# Patient Record
Sex: FEMALE | Race: BLACK OR AFRICAN AMERICAN | Employment: FULL TIME | URBAN - METROPOLITAN AREA
[De-identification: names, ages, dates, MRNs, and addresses within clinical notes are randomized per-mention and may not be internally consistent; named-entity substitution may affect disease eponyms.]

---

## 2020-08-21 ENCOUNTER — HOSPITAL ENCOUNTER (EMERGENCY)
Age: 55
Discharge: HOME | End: 2020-08-21
Attending: EMERGENCY MEDICINE
Payer: COMMERCIAL

## 2020-08-21 ENCOUNTER — APPOINTMENT (OUTPATIENT)
Dept: CT IMAGING | Age: 55
End: 2020-08-21
Attending: EMERGENCY MEDICINE
Payer: COMMERCIAL

## 2020-08-21 VITALS
RESPIRATION RATE: 18 BRPM | HEART RATE: 90 BPM | SYSTOLIC BLOOD PRESSURE: 180 MMHG | OXYGEN SATURATION: 100 % | HEIGHT: 66 IN | DIASTOLIC BLOOD PRESSURE: 87 MMHG | BODY MASS INDEX: 30.7 KG/M2 | TEMPERATURE: 98.6 F | WEIGHT: 191 LBS

## 2020-08-21 DIAGNOSIS — S00.03XA CONTUSION OF SCALP, INITIAL ENCOUNTER: ICD-10-CM
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
## 2020-08-21 DIAGNOSIS — I10 ESSENTIAL HYPERTENSION: ICD-10-CM

## 2020-08-21 LAB
ANION GAP SERPL CALC-SCNC: 6 MMOL/L (ref 3–18)
BASOPHILS # BLD: 0 K/UL (ref 0–0.1)
BASOPHILS NFR BLD: 0 % (ref 0–2)
BUN SERPL-MCNC: 9 MG/DL (ref 7–18)
BUN/CREAT SERPL: 10 (ref 12–20)
CALCIUM SERPL-MCNC: 8.9 MG/DL (ref 8.5–10.1)
CHLORIDE SERPL-SCNC: 106 MMOL/L (ref 100–111)
CO2 SERPL-SCNC: 26 MMOL/L (ref 21–32)
CREAT SERPL-MCNC: 0.87 MG/DL (ref 0.6–1.3)
DIFFERENTIAL METHOD BLD: ABNORMAL
EOSINOPHIL # BLD: 0.1 K/UL (ref 0–0.4)
EOSINOPHIL NFR BLD: 1 % (ref 0–5)
ERYTHROCYTE [DISTWIDTH] IN BLOOD BY AUTOMATED COUNT: 13.5 % (ref 11.6–14.5)
GLUCOSE SERPL-MCNC: 94 MG/DL (ref 74–99)
HCT VFR BLD AUTO: 39.1 % (ref 35–45)
HGB BLD-MCNC: 12.8 G/DL (ref 12–16)
LYMPHOCYTES # BLD: 2.2 K/UL (ref 0.9–3.6)
LYMPHOCYTES NFR BLD: 31 % (ref 21–52)
MCH RBC QN AUTO: 28.8 PG (ref 24–34)
MCHC RBC AUTO-ENTMCNC: 32.7 G/DL (ref 31–37)
MCV RBC AUTO: 88.1 FL (ref 74–97)
MONOCYTES # BLD: 0.6 K/UL (ref 0.05–1.2)
MONOCYTES NFR BLD: 9 % (ref 3–10)
NEUTS SEG # BLD: 4.3 K/UL (ref 1.8–8)
NEUTS SEG NFR BLD: 59 % (ref 40–73)
PLATELET # BLD AUTO: 414 K/UL (ref 135–420)
PMV BLD AUTO: 8.5 FL (ref 9.2–11.8)
POTASSIUM SERPL-SCNC: 3.5 MMOL/L (ref 3.5–5.5)
RBC # BLD AUTO: 4.44 M/UL (ref 4.2–5.3)
SODIUM SERPL-SCNC: 138 MMOL/L (ref 136–145)
WBC # BLD AUTO: 7.3 K/UL (ref 4.6–13.2)

## 2020-08-21 PROCEDURE — 70450 CT HEAD/BRAIN W/O DYE: CPT

## 2020-08-21 PROCEDURE — 85025 COMPLETE CBC W/AUTO DIFF WBC: CPT

## 2020-08-21 PROCEDURE — 80048 BASIC METABOLIC PNL TOTAL CA: CPT

## 2020-08-21 PROCEDURE — 99283 EMERGENCY DEPT VISIT LOW MDM: CPT

## 2020-08-21 RX ORDER — HYDRALAZINE HYDROCHLORIDE 25 MG/1
25 TABLET, FILM COATED ORAL 3 TIMES DAILY
COMMUNITY
End: 2020-11-09 | Stop reason: SDUPTHER

## 2020-08-21 RX ORDER — ALBUTEROL SULFATE 90 UG/1
AEROSOL, METERED RESPIRATORY (INHALATION)
COMMUNITY

## 2020-08-21 RX ORDER — CETIRIZINE HYDROCHLORIDE 10 MG/1
CAPSULE, LIQUID FILLED ORAL
COMMUNITY

## 2020-08-21 RX ORDER — VERAPAMIL HYDROCHLORIDE 180 MG/1
180 TABLET, EXTENDED RELEASE ORAL 2 TIMES DAILY
COMMUNITY

## 2020-08-21 NOTE — ED TRIAGE NOTES
Patient states that she experienced a fall on Friday and struck her head. She denies LOC, but states having dizziness s/p fall. She states that she has been having headaches for weeks. Advised that she awakened this morning with nosebleed, dizziness, and left side headache with tingling. Patient states that nosebleed has been in progress x 3 hours. Mild bleeding continues at time of triage.

## 2020-08-21 NOTE — ED PROVIDER NOTES
HPI patient says she got up in the middle of the night to go to the bathroom when she got up out of bed and walk several places she became dizzy and lightheaded and fell. She said that when she fell she hit the left side of her head against the wall. She denies any loss of consciousness or mental status changes. She says since that time she is been having a dull aching pain in the left side of her head. Patient also says she has a past history of nosebleeds but been going on for several months. She says that she had a small nosebleed after the fall and another nosebleed again this morning that resolved on its own. She also states concerned about her blood pressure she says \"I have had blood pressure problems and so was 24years old\" and she wanted her blood pressure checked today 2. She denies any chest pain or shortness of breath. She denies any abdominal pain. She denies any nausea or vomiting. No other complaints given at this time. Past Medical History:   Diagnosis Date    Hypertension     Rotator cuff tear     partial       Past Surgical History:   Procedure Laterality Date    HX HYSTERECTOMY           History reviewed. No pertinent family history.     Social History     Socioeconomic History    Marital status: SINGLE     Spouse name: Not on file    Number of children: Not on file    Years of education: Not on file    Highest education level: Not on file   Occupational History    Not on file   Social Needs    Financial resource strain: Not on file    Food insecurity     Worry: Not on file     Inability: Not on file    Transportation needs     Medical: Not on file     Non-medical: Not on file   Tobacco Use    Smoking status: Not on file   Substance and Sexual Activity    Alcohol use: Not on file    Drug use: Not on file    Sexual activity: Not on file   Lifestyle    Physical activity     Days per week: Not on file     Minutes per session: Not on file    Stress: Not on file Relationships    Social connections     Talks on phone: Not on file     Gets together: Not on file     Attends Church service: Not on file     Active member of club or organization: Not on file     Attends meetings of clubs or organizations: Not on file     Relationship status: Not on file    Intimate partner violence     Fear of current or ex partner: Not on file     Emotionally abused: Not on file     Physically abused: Not on file     Forced sexual activity: Not on file   Other Topics Concern    Not on file   Social History Narrative    Not on file         ALLERGIES: Patient has no known allergies. Review of Systems   Constitutional: Negative. HENT: Negative. Eyes: Negative. Respiratory: Negative. Cardiovascular: Negative. Gastrointestinal: Negative. Genitourinary: Negative. Musculoskeletal: Negative. Neurological: Positive for headaches. Psychiatric/Behavioral: Negative. Vitals:    08/21/20 1131   BP: 180/87   Pulse: 90   Resp: 18   Temp: 98.6 °F (37 °C)   SpO2: 100%   Weight: 86.6 kg (191 lb)   Height: 5' 6\" (1.676 m)            Physical Exam  Vitals signs and nursing note reviewed. Constitutional:       Appearance: She is well-developed. HENT:      Head: Normocephalic and atraumatic. Eyes:      Conjunctiva/sclera: Conjunctivae normal.      Pupils: Pupils are equal, round, and reactive to light. Neck:      Musculoskeletal: Normal range of motion and neck supple. Cardiovascular:      Rate and Rhythm: Normal rate and regular rhythm. Pulmonary:      Effort: Pulmonary effort is normal.      Breath sounds: Normal breath sounds. Abdominal:      Palpations: Abdomen is soft. Musculoskeletal: Normal range of motion. Skin:     General: Skin is warm and dry. Neurological:      Mental Status: She is alert and oriented to person, place, and time. MDM       Procedures            I reviewed the results of the ER evaluation with the patient.   She feels much better and says she is ready to go home. She will follow-up with her primary care physician for continued monitoring of her blood pressure. Patient understands and agrees with the disposition follow-up plan.   Elaine Marie MD 12:43 PM

## 2020-08-21 NOTE — DISCHARGE INSTRUCTIONS
Patient Education        Contusion: Care Instructions  Your Care Instructions     Contusion is the medical term for a bruise. It is the result of a direct blow or an impact, such as a fall. Contusions are common sports injuries. Most people think of a bruise as a black-and-blue spot. This happens when small blood vessels get torn and leak blood under the skin. But bones, muscles, and organs can also get bruised. This may damage deep tissues but not cause a bruise you can see. The doctor will do a physical exam to find the location of your contusion. You may also have tests to make sure you do not have a more serious injury, such as a broken bone or nerve damage. These may include X-rays or other imaging tests like a CT scan or MRI. Deep-tissue contusions may cause pain and swelling. But if there is no serious damage, they will often get better in a few weeks with home treatment. The doctor has checked you carefully, but problems can develop later. If you notice any problems or new symptoms, get medical treatment right away. Follow-up care is a key part of your treatment and safety. Be sure to make and go to all appointments, and call your doctor if you are having problems. It's also a good idea to know your test results and keep a list of the medicines you take. How can you care for yourself at home? · Put ice or a cold pack on the sore area for 10 to 20 minutes at a time to stop swelling. Put a thin cloth between the ice pack and your skin. · Be safe with medicines. Read and follow all instructions on the label. ? If the doctor gave you a prescription medicine for pain, take it as prescribed. ? If you are not taking a prescription pain medicine, ask your doctor if you can take an over-the-counter medicine. · If you can, prop up the sore area on pillows as much as possible for the next few days. Try to keep the sore area above the level of your heart. When should you call for help?    Call your doctor now or seek immediate medical care if:  · Your pain gets worse. · You have new or worse swelling. · You have tingling, weakness, or numbness in the area near the contusion. · The area near the contusion is cold or pale. Watch closely for changes in your health, and be sure to contact your doctor if:  · You do not get better as expected. Where can you learn more? Go to http://sherrie-jodie.info/  Enter [id number] in the search box to learn more about \"Contusion: Care Instructions. \"  Current as of: June 26, 2019               Content Version: 12.5  © 2246-4955 [employer], [employer]. Care instructions adapted under license by [employer] (which disclaims liability or warranty for this information). If you have questions about a medical condition or this instruction, always ask your healthcare professional. [address] any warranty or liability for your use of this information.

## 2020-10-01 ENCOUNTER — OFFICE VISIT (OUTPATIENT)
Dept: CARDIOLOGY CLINIC | Age: 55
End: 2020-10-01
Payer: COMMERCIAL

## 2020-10-01 VITALS
HEIGHT: 66 IN | BODY MASS INDEX: 30.86 KG/M2 | WEIGHT: 192 LBS | TEMPERATURE: 97.4 F | DIASTOLIC BLOOD PRESSURE: 102 MMHG | HEART RATE: 87 BPM | SYSTOLIC BLOOD PRESSURE: 161 MMHG

## 2020-10-01 DIAGNOSIS — R00.2 PALPITATIONS: ICD-10-CM

## 2020-10-01 DIAGNOSIS — R06.02 SOB (SHORTNESS OF BREATH) ON EXERTION: Primary | ICD-10-CM

## 2020-10-01 DIAGNOSIS — R42 DIZZINESS: ICD-10-CM

## 2020-10-01 DIAGNOSIS — I10 ESSENTIAL HYPERTENSION: ICD-10-CM

## 2020-10-01 DIAGNOSIS — E66.9 OBESITY (BMI 30.0-34.9): ICD-10-CM

## 2020-10-01 PROCEDURE — 99204 OFFICE O/P NEW MOD 45 MIN: CPT | Performed by: INTERNAL MEDICINE

## 2020-10-01 RX ORDER — DICLOFENAC SODIUM 75 MG/1
TABLET, DELAYED RELEASE ORAL 2 TIMES DAILY
COMMUNITY
End: 2021-10-14 | Stop reason: ALTCHOICE

## 2020-10-01 RX ORDER — ESCITALOPRAM OXALATE 10 MG/1
10 TABLET ORAL DAILY
COMMUNITY
End: 2021-10-14 | Stop reason: ALTCHOICE

## 2020-10-01 RX ORDER — GABAPENTIN 600 MG/1
900 TABLET ORAL DAILY
COMMUNITY

## 2020-10-01 RX ORDER — HYDROCHLOROTHIAZIDE 12.5 MG/1
12.5 TABLET ORAL DAILY
Qty: 30 TAB | Refills: 3 | Status: SHIPPED | OUTPATIENT
Start: 2020-10-01 | End: 2021-07-29

## 2020-10-01 NOTE — PROGRESS NOTES
HISTORY OF PRESENT ILLNESS  Taty Hong is a 48 y.o. female. 10/20 Seen as new patient for uncontrolled hypertension, has had headaches and was recently diagnosed with sleep apnea. H/o nose bleeds with HTN    New Patient   The history is provided by the patient and medical records. Associated symptoms include shortness of breath. Pertinent negatives include no chest pain and no headaches. Palpitations    The history is provided by the patient. This is a new problem. The current episode started more than 1 week ago (yrs). The problem has been gradually worsening (1/20). The problem occurs every several days (almost daily). The problem is associated with nothing (activity and rest). Associated symptoms include dizziness and shortness of breath. Pertinent negatives include no fever, no malaise/fatigue, no chest pain, no claudication, no orthopnea, no PND, no syncope, no nausea, no vomiting, no headaches and no cough. Dizziness   The history is provided by the patient. This is a new problem. The current episode started more than 1 week ago (yrs). The problem occurs daily. The problem has been gradually worsening (since 1/20 when falls became more). Associated symptoms include shortness of breath. Pertinent negatives include no chest pain and no headaches. The symptoms are aggravated by standing (3-4 minutes; frequent falls). The symptoms are relieved by rest.   Shortness of Breath   The history is provided by the patient. This is a new problem. The problem occurs intermittently. The current episode started more than 1 week ago (yrs). Pertinent negatives include no fever, no headaches, no cough, no wheezing, no PND, no orthopnea, no chest pain, no syncope, no vomiting, no rash, no leg swelling and no claudication. The problem's precipitants include exercise (steps, shower, house chores).      No Known Allergies    Past Medical History:   Diagnosis Date    Bleeding from the nose     frequent    Hypertension     Rotator cuff tear     partial       Family History   Problem Relation Age of Onset    Heart defect Mother     Stroke Neg Hx     Heart Attack Neg Hx        Social History     Tobacco Use    Smoking status: Never Smoker    Smokeless tobacco: Never Used   Substance Use Topics    Alcohol use: Not Currently    Drug use: Never        Current Outpatient Medications   Medication Sig    gabapentin (NEURONTIN) 600 mg tablet Take  by mouth daily.  diclofenac EC (VOLTAREN) 75 mg EC tablet Take  by mouth two (2) times a day.  escitalopram oxalate (LEXAPRO) 10 mg tablet Take 10 mg by mouth daily.  verapamil ER (CALAN-SR) 180 mg CR tablet Take 180 mg by mouth two (2) times a day.  Cetirizine (ZyrTEC) 10 mg cap Take  by mouth.  hydrALAZINE (APRESOLINE) 25 mg tablet Take 25 mg by mouth three (3) times daily.  albuterol (ProAir HFA) 90 mcg/actuation inhaler Take  by inhalation. No current facility-administered medications for this visit. Past Surgical History:   Procedure Laterality Date    HX CARPAL TUNNEL RELEASE      HX HYSTERECTOMY      HX LAP CHOLECYSTECTOMY      HX SHOULDER ARTHROSCOPY         Visit Vitals  BP (!) 161/102 (BP 1 Location: Right arm, BP Patient Position: Standing)   Pulse 87   Temp 97.4 °F (36.3 °C) (Temporal)   Ht 5' 6\" (1.676 m)   Wt 87.1 kg (192 lb)   BMI 30.99 kg/m²     Vitals:    10/01/20 1045 10/01/20 1051 10/01/20 1052   BP: (!) 167/98 (!) 160/95 (!) 161/102   BP 1 Location: Right arm Right arm Right arm   BP Patient Position: Sitting Supine Standing   Pulse: 79 75 87   Temp: 97.4 °F (36.3 °C)     TempSrc: Temporal     Weight: 87.1 kg (192 lb)     Height: 5' 6\" (1.676 m)           Diagnostic Studies:  I have reviewed the relevant tests done on the patient and show as follows  EKG tracings reviewed by me today. EKG Results     None        XR Results (most recent):  No results found for this or any previous visit.       Ms. Anner Scheuermann has a reminder for a \"due or due soon\" health maintenance. I have asked that she contact her primary care provider for follow-up on this health maintenance. Review of Systems   Constitutional: Negative for chills, fever, malaise/fatigue and weight loss. HENT: Negative for nosebleeds. Eyes: Negative for discharge. Respiratory: Positive for shortness of breath. Negative for cough and wheezing. Cardiovascular: Positive for palpitations. Negative for chest pain, orthopnea, claudication, leg swelling, syncope and PND. Gastrointestinal: Negative for diarrhea, nausea and vomiting. Genitourinary: Negative for dysuria and hematuria. Musculoskeletal: Negative for joint pain. Skin: Negative for rash. Neurological: Positive for dizziness. Negative for seizures, loss of consciousness and headaches. Endo/Heme/Allergies: Negative for polydipsia. Does not bruise/bleed easily. Psychiatric/Behavioral: Negative for depression and substance abuse. The patient does not have insomnia. Physical Exam   Constitutional: She is oriented to person, place, and time. She appears well-developed and well-nourished. No distress. HENT:   Head: Normocephalic and atraumatic. Mouth/Throat: Normal dentition. Eyes: Right eye exhibits no discharge. Left eye exhibits no discharge. No scleral icterus. Neck: Neck supple. No JVD present. Carotid bruit is not present. No thyromegaly present. Cardiovascular: Normal rate, regular rhythm, S1 normal, S2 normal, normal heart sounds and intact distal pulses. Exam reveals no gallop and no friction rub. No murmur heard. Pulmonary/Chest: Effort normal and breath sounds normal. She has no wheezes. She has no rales. Abdominal: Soft. She exhibits no mass. There is no abdominal tenderness. Musculoskeletal:         General: No edema. Lymphadenopathy:        Right cervical: No superficial cervical adenopathy present. Left cervical: No superficial cervical adenopathy present.    Neurological: She is alert and oriented to person, place, and time. Skin: Skin is warm and dry. No rash noted. Psychiatric: She has a normal mood and affect. Her behavior is normal.       ASSESSMENT and PLAN    Patient has multiple symptoms including dizziness, palpitations, shortness of breath and intermittent edema. The symptoms are there for years. She has history of frequent falls and has surgeries on both shoulders and thinks that she falls with dizziness. No orthostatic changes in the office today. Blood pressure is elevated. Add HCTZ and follow-up labs as well as home BP chart. Check echo and event monitor. She cannot raise her arm but I think we can wait on the stress test for now. She has seen a neurologist, Dr. Lilli Kocher and apparently all work-up has been negative per patient. Not sure if EEG was done to rule out any seizures. Diagnoses and all orders for this visit:    1. SOB (shortness of breath) on exertion  -     ECHO ADULT COMPLETE; Future    2. Palpitations  -     SD EXTERNAL MOBILE CV TELEMETRY W/I&REPORT UP TO 30 DAYS    3. Dizziness  -     SD EXTERNAL MOBILE CV TELEMETRY W/I&REPORT UP TO 30 DAYS    4. Essential hypertension  -     hydroCHLOROthiazide (HYDRODIURIL) 12.5 mg tablet; Take 1 Tab by mouth daily.  -     METABOLIC PANEL, BASIC; Future  -     MAGNESIUM; Future  -     LIPID PANEL; Future  -     HEPATIC FUNCTION PANEL; Future    5. Obesity (BMI 30.0-34. 9)        Pertinent laboratory and test data reviewed and discussed with patient. See patient instructions also for other medical advice given    There are no discontinued medications.     Follow-up and Dispositions    · Return in about 6 weeks (around 11/12/2020), or if symptoms worsen or fail to improve, for post test.

## 2020-10-01 NOTE — PATIENT INSTRUCTIONS
There are no discontinued medications. After the recommended changes have been made in blood pressure medicines, patient advised to keep BP/HR(pulse rate) chart twice daily and bring us results in next office visit. Patient may send the results via \"My Chart\" if desired. Please rest for 5-10 minutes before checking blood pressure. Sit on a comfortable chair without crossing the legs and put your arm on a table. We recommend that you use an upper arm cuff. Check the blood pressure 3 times each time you check the blood pressure and record the lowest reading. If you check the blood pressure in both arms, use the higher reading. High Blood Pressure: Care Instructions Overview It's normal for blood pressure to go up and down throughout the day. But if it stays up, you have high blood pressure. Another name for high blood pressure is hypertension. Despite what a lot of people think, high blood pressure usually doesn't cause headaches or make you feel dizzy or lightheaded. It usually has no symptoms. But it does increase your risk of stroke, heart attack, and other problems. You and your doctor will talk about your risks of these problems based on your blood pressure. Your doctor will give you a goal for your blood pressure. Your goal will be based on your health and your age. Lifestyle changes, such as eating healthy and being active, are always important to help lower blood pressure. You might also take medicine to reach your blood pressure goal. 
Follow-up care is a key part of your treatment and safety. Be sure to make and go to all appointments, and call your doctor if you are having problems. It's also a good idea to know your test results and keep a list of the medicines you take. How can you care for yourself at home? Medical treatment · If you stop taking your medicine, your blood pressure will go back up. You may take one or more types of medicine to lower your blood pressure. Be safe with medicines. Take your medicine exactly as prescribed. Call your doctor if you think you are having a problem with your medicine. · Talk to your doctor before you start taking aspirin every day. Aspirin can help certain people lower their risk of a heart attack or stroke. But taking aspirin isn't right for everyone, because it can cause serious bleeding. · See your doctor regularly. You may need to see the doctor more often at first or until your blood pressure comes down. · If you are taking blood pressure medicine, talk to your doctor before you take decongestants or anti-inflammatory medicine, such as ibuprofen. Some of these medicines can raise blood pressure. · Learn how to check your blood pressure at home. Lifestyle changes · Stay at a healthy weight. This is especially important if you put on weight around the waist. Losing even 10 pounds can help you lower your blood pressure. · If your doctor recommends it, get more exercise. Walking is a good choice. Bit by bit, increase the amount you walk every day. Try for at least 30 minutes on most days of the week. You also may want to swim, bike, or do other activities. · Avoid or limit alcohol. Talk to your doctor about whether you can drink any alcohol. · Try to limit how much sodium you eat to less than 2,300 milligrams (mg) a day. Your doctor may ask you to try to eat less than 1,500 mg a day. · Eat plenty of fruits (such as bananas and oranges), vegetables, legumes, whole grains, and low-fat dairy products. · Lower the amount of saturated fat in your diet. Saturated fat is found in animal products such as milk, cheese, and meat. Limiting these foods may help you lose weight and also lower your risk for heart disease. · Do not smoke. Smoking increases your risk for heart attack and stroke. If you need help quitting, talk to your doctor about stop-smoking programs and medicines. These can increase your chances of quitting for good. When should you call for help? Call  911 anytime you think you may need emergency care. This may mean having symptoms that suggest that your blood pressure is causing a serious heart or blood vessel problem. Your blood pressure may be over 180/120. For example, call 911 if: 
  · You have symptoms of a heart attack. These may include: 
? Chest pain or pressure, or a strange feeling in the chest. 
? Sweating. ? Shortness of breath. ? Nausea or vomiting. ? Pain, pressure, or a strange feeling in the back, neck, jaw, or upper belly or in one or both shoulders or arms. ? Lightheadedness or sudden weakness. ? A fast or irregular heartbeat.  
  · You have symptoms of a stroke. These may include: 
? Sudden numbness, tingling, weakness, or loss of movement in your face, arm, or leg, especially on only one side of your body. ? Sudden vision changes. ? Sudden trouble speaking. ? Sudden confusion or trouble understanding simple statements. ? Sudden problems with walking or balance. ? A sudden, severe headache that is different from past headaches.  
  · You have severe back or belly pain. Do not wait until your blood pressure comes down on its own. Get help right away. Call your doctor now or seek immediate care if: 
  · Your blood pressure is much higher than normal (such as 180/120 or higher), but you don't have symptoms.  
  · You think high blood pressure is causing symptoms, such as: 
? Severe headache. 
? Blurry vision. Watch closely for changes in your health, and be sure to contact your doctor if: 
  · Your blood pressure measures higher than your doctor recommends at least 2 times. That means the top number is higher or the bottom number is higher, or both.  
  · You think you may be having side effects from your blood pressure medicine. Where can you learn more? Go to http://www.gray.com/ Enter V412 in the search box to learn more about \"High Blood Pressure: Care Instructions. \" Current as of: 2019               Content Version: 12.6 © 0506-6501 [employer]. Care instructions adapted under license by [employer] (which disclaims liability or warranty for this information). If you have questions about a medical condition or this instruction, always ask your healthcare professional. [address] any warranty or liability for your use of this information. [employer] Activation Thank you for requesting access to [facility]. Please follow the instructions below to securely access and download your online medical record. [facility] allows you to send messages to your doctor, view your test results, renew your prescriptions, schedule appointments, and more. How Do I Sign Up? 1. In your internet browser, go to [email or web address]. 2. Click on the First Time User? Click Here link in the Sign In box. You will see the New Member Sign Up page. 3. Enter your [facility] Access Code exactly as it appears below. You will not need to use this code after youve completed the sign-up process. If you do not sign up before the expiration date, you must request a new code. [facility] Access Code: [email or web address] Expires: 11/15/2020 10:10 AM (This is the date your [facility] access code will ) 4. Enter the last four digits of your Social Security Number (xxxx) and Date of Birth (mm/dd/yyyy) as indicated and click Submit. You will be taken to the next sign-up page. 5. Create a [facility] ID. This will be your [facility] login ID and cannot be changed, so think of one that is secure and easy to remember. 6. Create a [facility] password. You can change your password at any time. 7. Enter your Password Reset Question and Answer. This can be used at a later time if you forget your password. 8. Enter your e-mail address. You will receive e-mail notification when new information is available in [address] 9. Click Sign Up. You can now view and download portions of your medical record. 10. Click the Download Summary menu link to download a portable copy of your medical information. Additional Information If you have questions, please visit the Frequently Asked Questions section of the [employer] website at [email or web address] Remember, [facility] is NOT to be used for urgent needs. For medical emergencies, dial 911.

## 2020-10-04 DIAGNOSIS — R06.02 SOB (SHORTNESS OF BREATH) ON EXERTION: ICD-10-CM

## 2020-10-07 LAB
A-G RATIO,AGRAT: 1.1 RATIO (ref 1.1–2.6)
ALBUMIN SERPL-MCNC: 4.1 G/DL (ref 3.5–5)
ALP SERPL-CCNC: 65 U/L (ref 25–115)
ALT SERPL-CCNC: 17 U/L (ref 5–40)
ANION GAP SERPL CALC-SCNC: 9.4 MMOL/L (ref 3–15)
AST SERPL W P-5'-P-CCNC: 16 U/L (ref 10–37)
BILIRUB SERPL-MCNC: 0.3 MG/DL (ref 0.2–1.2)
BILIRUBIN, DIRECT,CBIL: <0.2 MG/DL (ref 0–0.3)
BUN SERPL-MCNC: 9 MG/DL (ref 6–22)
CALCIUM SERPL-MCNC: 9.3 MG/DL (ref 8.4–10.5)
CHLORIDE SERPL-SCNC: 102 MMOL/L (ref 98–110)
CHOLEST SERPL-MCNC: 189 MG/DL (ref 110–200)
CO2 SERPL-SCNC: 27 MMOL/L (ref 20–32)
CREAT SERPL-MCNC: 0.8 MG/DL (ref 0.5–1.2)
GFRAA, 66117: >60
GFRNA, 66118: >60
GLOBULIN,GLOB: 3.8 G/DL (ref 2–4)
GLUCOSE SERPL-MCNC: 78 MG/DL (ref 70–99)
HDLC SERPL-MCNC: 4.3 MG/DL (ref 0–5)
HDLC SERPL-MCNC: 44 MG/DL
LDL/HDL RATIO,LDHD: 2.8
LDLC SERPL CALC-MCNC: 123 MG/DL (ref 50–99)
MAGNESIUM SERPL-MCNC: 2 MG/DL (ref 1.6–2.5)
NON-HDL CHOLESTEROL, 011976: 145 MG/DL
POTASSIUM SERPL-SCNC: 3.2 MMOL/L (ref 3.5–5.5)
PROT SERPL-MCNC: 7.9 G/DL (ref 6.4–8.3)
SODIUM SERPL-SCNC: 138 MMOL/L (ref 133–145)
TRIGL SERPL-MCNC: 114 MG/DL (ref 40–149)
VLDLC SERPL CALC-MCNC: 23 MG/DL (ref 8–30)

## 2020-10-20 LAB
AV VELOCITY RATIO: 0.82
ECHO AO ASC DIAM: 3.8 CM
ECHO AO ROOT DIAM: 3.37 CM
ECHO AV PEAK GRADIENT: 4.7 MMHG
ECHO AV PEAK VELOCITY: 107.97 CM/S
ECHO EST RA PRESSURE: 3 MMHG
ECHO LA AREA 2C: 16.1 CM2
ECHO LA AREA 4C: 15.1 CM2
ECHO LA MAJOR AXIS: 3.01 CM
ECHO LA MINOR AXIS: 1.53 CM
ECHO LA TO AORTIC ROOT RATIO: 0.89
ECHO LA VOL 2C: 37.28 ML (ref 22–52)
ECHO LA VOL 4C: 34.02 ML (ref 22–52)
ECHO LA VOL BP: 41.44 ML (ref 22–52)
ECHO LA VOL/BSA BIPLANE: 21.08 ML/M2 (ref 16–28)
ECHO LA VOLUME INDEX A2C: 18.96 ML/M2 (ref 16–28)
ECHO LA VOLUME INDEX A4C: 17.31 ML/M2 (ref 16–28)
ECHO LV E' LATERAL VELOCITY: 8.23 CM/S
ECHO LV E' SEPTAL VELOCITY: 6.28 CM/S
ECHO LV EDV TEICHHOLZ: 43.75 ML
ECHO LV ESV TEICHHOLZ: 15.45 ML
ECHO LV INTERNAL DIMENSION DIASTOLIC: 4.41 CM (ref 3.9–5.3)
ECHO LV INTERNAL DIMENSION SYSTOLIC: 2.86 CM
ECHO LV IVSD: 1.37 CM (ref 0.6–0.9)
ECHO LV MASS 2D: 234.6 G (ref 67–162)
ECHO LV MASS INDEX 2D: 119.4 G/M2 (ref 43–95)
ECHO LV POSTERIOR WALL DIASTOLIC: 1.38 CM (ref 0.6–0.9)
ECHO LVOT PEAK GRADIENT: 3.1 MMHG
ECHO LVOT PEAK VELOCITY: 88.71 CM/S
ECHO MV "A" WAVE DURATION: 144.2 S
ECHO MV A VELOCITY: 56.33 CM/S
ECHO MV AREA PHT: 3.4 CM2
ECHO MV E DECELERATION TIME (DT): 219.9 MS
ECHO MV E VELOCITY: 58.51 CM/S
ECHO MV E/A RATIO: 1.04
ECHO MV E/E' LATERAL: 7.11
ECHO MV E/E' RATIO (AVERAGED): 8.21
ECHO MV E/E' SEPTAL: 9.32
ECHO MV PRESSURE HALF TIME (PHT): 63.8 MS
ECHO PULMONARY ARTERY SYSTOLIC PRESSURE (PASP): 21 MMHG
ECHO PV MAX VELOCITY: 75.72 CM/S
ECHO PV PEAK GRADIENT: 2.3 MMHG
ECHO PV PEAK INSTANTANEOUS GRADIENT SYSTOLIC: 2.3 MMHG
ECHO RA AREA 4C: 12 CM2
ECHO RV INTERNAL DIMENSION: 3.47 CM
ECHO RV TAPSE: 2.14 CM (ref 1.5–2)
ECHO TV REGURGITANT MAX VELOCITY: 213.1 CM/S
ECHO TV REGURGITANT MAX VELOCITY: 213.1 CM/S
ECHO TV REGURGITANT PEAK GRADIENT: 18.16 MMHG
ECHO TV REGURGITANT PEAK GRADIENT: 18.2 MMHG
LVFS 2D: 35.15 %
LVSV (TEICH): 28.3 ML
MV DEC SLOPE: 2.66

## 2020-11-09 ENCOUNTER — OFFICE VISIT (OUTPATIENT)
Dept: CARDIOLOGY CLINIC | Age: 55
End: 2020-11-09
Payer: COMMERCIAL

## 2020-11-09 VITALS
SYSTOLIC BLOOD PRESSURE: 166 MMHG | TEMPERATURE: 97.2 F | DIASTOLIC BLOOD PRESSURE: 98 MMHG | HEART RATE: 81 BPM | WEIGHT: 195 LBS | BODY MASS INDEX: 31.34 KG/M2 | HEIGHT: 66 IN

## 2020-11-09 DIAGNOSIS — E78.00 HYPERCHOLESTEREMIA: ICD-10-CM

## 2020-11-09 DIAGNOSIS — I10 ESSENTIAL HYPERTENSION: ICD-10-CM

## 2020-11-09 DIAGNOSIS — E66.9 OBESITY (BMI 30.0-34.9): ICD-10-CM

## 2020-11-09 DIAGNOSIS — I50.32 CHRONIC DIASTOLIC CONGESTIVE HEART FAILURE (HCC): Primary | ICD-10-CM

## 2020-11-09 PROCEDURE — 99214 OFFICE O/P EST MOD 30 MIN: CPT | Performed by: INTERNAL MEDICINE

## 2020-11-09 RX ORDER — ATORVASTATIN CALCIUM 10 MG/1
10 TABLET, FILM COATED ORAL DAILY
Qty: 30 TAB | Refills: 5 | Status: SHIPPED | OUTPATIENT
Start: 2020-11-09

## 2020-11-09 RX ORDER — HYDRALAZINE HYDROCHLORIDE 50 MG/1
50 TABLET, FILM COATED ORAL 3 TIMES DAILY
Qty: 270 TAB | Refills: 1 | Status: SHIPPED | OUTPATIENT
Start: 2020-11-09 | End: 2021-10-14 | Stop reason: SDUPTHER

## 2020-11-09 RX ORDER — OXYCODONE AND ACETAMINOPHEN 5; 325 MG/1; MG/1
TABLET ORAL
COMMUNITY
End: 2021-10-14 | Stop reason: ALTCHOICE

## 2020-11-09 RX ORDER — METOPROLOL TARTRATE 25 MG/1
25 TABLET, FILM COATED ORAL 2 TIMES DAILY
Qty: 60 TAB | Refills: 3 | Status: SHIPPED | OUTPATIENT
Start: 2020-11-09 | End: 2020-11-17

## 2020-11-09 NOTE — PROGRESS NOTES
HISTORY OF PRESENT ILLNESS  Donna Gallardo is a 48 y.o. female. 10/20 Seen as new patient for uncontrolled hypertension, has had headaches and was recently diagnosed with sleep apnea. H/o nose bleeds with HTN    Palpitations    The history is provided by the patient. This is a new problem. The current episode started more than 1 week ago (yrs). The problem has not changed (1/20) since onset. The problem occurs every several days (almost daily). The problem is associated with nothing (activity and rest). Associated symptoms include dizziness and shortness of breath. Pertinent negatives include no fever, no malaise/fatigue, no chest pain, no claudication, no orthopnea, no PND, no syncope, no nausea, no vomiting, no headaches and no cough. Her past medical history is significant for hypertension. Dizziness   The history is provided by the patient. This is a new problem. The current episode started more than 1 week ago (yrs). The problem occurs daily. The problem has not changed (since 1/20 when falls became more) since onset. Associated symptoms include shortness of breath. Pertinent negatives include no chest pain and no headaches. The symptoms are aggravated by standing (3-4 minutes; frequent falls). The symptoms are relieved by rest.   Shortness of Breath   The history is provided by the patient. This is a new problem. The problem occurs intermittently. The current episode started more than 1 week ago (yrs). The problem has not changed since onset. Pertinent negatives include no fever, no headaches, no cough, no wheezing, no PND, no orthopnea, no chest pain, no syncope, no vomiting, no rash, no leg swelling and no claudication. The problem's precipitants include exercise (steps, shower, house chores). Hypertension   The history is provided by the medical records. This is a chronic problem. Associated symptoms include shortness of breath. Pertinent negatives include no chest pain and no headaches.      No Known Allergies    Past Medical History:   Diagnosis Date    Bleeding from the nose     frequent    Hypertension     Rotator cuff tear     partial       Family History   Problem Relation Age of Onset    Heart defect Mother     Stroke Neg Hx     Heart Attack Neg Hx        Social History     Tobacco Use    Smoking status: Never Smoker    Smokeless tobacco: Never Used   Substance Use Topics    Alcohol use: Not Currently    Drug use: Never        Current Outpatient Medications   Medication Sig    oxyCODONE-acetaminophen (Percocet) 5-325 mg per tablet Take  by mouth every six (6) hours as needed for Pain.  gabapentin (NEURONTIN) 600 mg tablet Take  by mouth daily.  diclofenac EC (VOLTAREN) 75 mg EC tablet Take  by mouth two (2) times a day.  escitalopram oxalate (LEXAPRO) 10 mg tablet Take 10 mg by mouth daily.  hydroCHLOROthiazide (HYDRODIURIL) 12.5 mg tablet Take 1 Tab by mouth daily.  verapamil ER (CALAN-SR) 180 mg CR tablet Take 180 mg by mouth two (2) times a day.  Cetirizine (ZyrTEC) 10 mg cap Take  by mouth.  hydrALAZINE (APRESOLINE) 25 mg tablet Take 25 mg by mouth three (3) times daily.  albuterol (ProAir HFA) 90 mcg/actuation inhaler Take  by inhalation. No current facility-administered medications for this visit. Past Surgical History:   Procedure Laterality Date    HX CARPAL TUNNEL RELEASE      HX HYSTERECTOMY      HX LAP CHOLECYSTECTOMY      HX SHOULDER ARTHROSCOPY         Visit Vitals  BP (!) 166/98   Pulse 81   Temp 97.2 °F (36.2 °C)   Ht 5' 6\" (1.676 m)   Wt 88.5 kg (195 lb)   BMI 31.47 kg/m²     Vitals:    11/09/20 1309   BP: (!) 166/98   Pulse: 81   Temp: 97.2 °F (36.2 °C)   Weight: 88.5 kg (195 lb)   Height: 5' 6\" (1.676 m)         Diagnostic Studies:  I have reviewed the relevant tests done on the patient and show as follows  EKG tracings reviewed by me today.     EKG Results     None        XR Results (most recent):  No results found for this or any previous visit. 10/20 echInterpretation Summary     · LV: Estimated LVEF is 60 - 65%. Normal cavity size, systolic function (ejection fraction normal) and diastolic function. Moderate concentric hypertrophy. · MV: Mild mitral valve regurgitation is present. · PV: Mild pulmonic valve regurgitation is present. · AO: Mild ascending aorta dilatation. Ascending aorta diameter = 3.8 cm. · PA: Pulmonary arterial systolic pressure is 21 mmHg. Comparison Study Information     Prior Study     There is no prior study available for comparison     o    Ms. Eloina Mcmillan has a reminder for a \"due or due soon\" health maintenance. I have asked that she contact her primary care provider for follow-up on this health maintenance. Review of Systems   Constitutional: Negative for chills, fever, malaise/fatigue and weight loss. HENT: Negative for nosebleeds. Eyes: Negative for discharge. Respiratory: Positive for shortness of breath. Negative for cough and wheezing. Cardiovascular: Positive for palpitations. Negative for chest pain, orthopnea, claudication, leg swelling, syncope and PND. Gastrointestinal: Negative for diarrhea, nausea and vomiting. Genitourinary: Negative for dysuria and hematuria. Musculoskeletal: Negative for joint pain. Skin: Negative for rash. Neurological: Positive for dizziness. Negative for seizures, loss of consciousness and headaches. Endo/Heme/Allergies: Negative for polydipsia. Does not bruise/bleed easily. Psychiatric/Behavioral: Negative for depression and substance abuse. The patient does not have insomnia. Physical Exam   Constitutional: She is oriented to person, place, and time. She appears well-developed and well-nourished. No distress. obese   HENT:   Head: Normocephalic and atraumatic. Mouth/Throat: Normal dentition. Eyes: Right eye exhibits no discharge. Left eye exhibits no discharge. No scleral icterus. Neck: Neck supple. No JVD present.  Carotid bruit is not present. No thyromegaly present. Cardiovascular: Normal rate, regular rhythm, S1 normal, S2 normal, normal heart sounds and intact distal pulses. Exam reveals no gallop and no friction rub. No murmur heard. Pulmonary/Chest: Effort normal and breath sounds normal. She has no wheezes. She has no rales. Abdominal: Soft. She exhibits no mass. There is no abdominal tenderness. Musculoskeletal:         General: No edema. Lymphadenopathy:        Right cervical: No superficial cervical adenopathy present. Left cervical: No superficial cervical adenopathy present. Neurological: She is alert and oriented to person, place, and time. Skin: Skin is warm and dry. No rash noted. Psychiatric: She has a normal mood and affect. Her behavior is normal.       ASSESSMENT and PLAN    Mitral regurgitation: Mild 10/20; Aortic ectasia: 10/20 ASC ao 3.8 cm;    HLD : Results for Yeyo Quinones (MRN [id number]) as of 11/9/2020 13:26   Ref. Range 10/7/2020 11:19   Triglyceride Latest Ref Range: 40 - 149 mg/dL 114   Cholesterol, total Latest Ref Range: 110 - 200 mg/dL 189   HDL Cholesterol Latest Ref Range: >=40 mg/dL 44   CHOLESTEROL/HDL Latest Ref Range: 0.0 - 5.0  4.3   Non-HDL Cholesterol Latest Ref Range: <130 mg/dL 145 (H)   VLDL, calculated Latest Ref Range: 8 - 30 mg/dL 23   LDL, calculated Latest Ref Range: 50 - 99 mg/dL 123 (H)   LDL/HDL Ratio Unknown 2.8       As of 10/20, ACC CVD risk score is 13.5% for the next 10 years; start statins      10/20 Patient has multiple symptoms including dizziness, palpitations, shortness of breath and intermittent edema. The symptoms are there for years. She has history of frequent falls and has surgeries on both shoulders and thinks that she falls with dizziness. No orthostatic changes in the office today. Blood pressure is elevated. Add HCTZ and follow-up labs as well as home BP chart. Check echo and event monitor.   She cannot raise her arm but I think we can wait on the stress test for now. She has seen a neurologist, Dr. Ney Hope and apparently all work-up has been negative per patient. Not sure if EEG was done to rule out any seizures. 11/20 blood pressure still elevated. Add metoprolol and increase hydralazine. Continues to have significant symptoms likely secondary to chronic diastolic CHF. Diet weight and exercise discussed. She will try plant-based diet  Lipids are elevated, start statins and follow-up labs. Diagnoses and all orders for this visit:    1. Chronic diastolic congestive heart failure ([address]    2. Essential hypertension  -     hydrALAZINE (APRESOLINE) 50 mg tablet; Take 1 Tab by mouth three (3) times daily. -     metoprolol tartrate (LOPRESSOR) 25 mg tablet; Take 1 Tab by mouth two (2) times a day. 3. Obesity (BMI 30.0-34.9)    4. Hypercholesteremia  -     atorvastatin (LIPITOR) 10 mg tablet; Take 1 Tab by mouth daily.  -     LIPID PANEL; Future  -     HEPATIC FUNCTION PANEL; Future        Pertinent laboratory and test data reviewed and discussed with patient.   See patient instructions also for other medical advice given    Medications Discontinued During This Encounter   Medication Reason    hydrALAZINE (APRESOLINE) 25 mg tablet REORDER       Follow-up and Dispositions    · Return in about 3 months (around 2/9/2021), or if symptoms worsen or fail to improve, for post test.

## 2020-11-09 NOTE — PROGRESS NOTES
1. Have you been to the ER, urgent care clinic since your last visit? Hospitalized since your last visit?     no    2. Have you seen or consulted any other health care providers outside of the [address] [place] [address] since your last visit? Include any pap smears or colon screening. no    3. Since your last visit, have you had any of the following symptoms? Sob, palpitations, chest pain last night          4. Have you had any blood work, X-rays or cardiac testing? Yes , [facility]            5.  Where do you normally have your labs drawn? [facility]    6. Do you need any refills today?    no

## 2020-11-09 NOTE — PATIENT INSTRUCTIONS
Medications Discontinued During This Encounter   Medication Reason    hydrALAZINE (APRESOLINE) 25 mg tablet REORDER     After the recommended changes have been made in blood pressure medicines, patient advised to keep BP/HR(pulse rate) chart twice daily and bring us results in next 4 to 5 days. Patient may send the results via \"My Chart\" if desired. Please rest for 5-10 minutes before checking blood pressure. Sit on a comfortable chair without crossing the legs and put your arm on a table. We recommend that you use an upper arm cuff. Check the blood pressure 3 times each time you check the blood pressure and record the lowest reading. If you check the blood pressure in both arms, use the higher reading. High Blood Pressure: Care Instructions  Overview     It's normal for blood pressure to go up and down throughout the day. But if it stays up, you have high blood pressure. Another name for high blood pressure is hypertension. Despite what a lot of people think, high blood pressure usually doesn't cause headaches or make you feel dizzy or lightheaded. It usually has no symptoms. But it does increase your risk of stroke, heart attack, and other problems. You and your doctor will talk about your risks of these problems based on your blood pressure. Your doctor will give you a goal for your blood pressure. Your goal will be based on your health and your age. Lifestyle changes, such as eating healthy and being active, are always important to help lower blood pressure. You might also take medicine to reach your blood pressure goal.  Follow-up care is a key part of your treatment and safety. Be sure to make and go to all appointments, and call your doctor if you are having problems. It's also a good idea to know your test results and keep a list of the medicines you take. How can you care for yourself at home? Medical treatment  · If you stop taking your medicine, your blood pressure will go back up.  You may take one or more types of medicine to lower your blood pressure. Be safe with medicines. Take your medicine exactly as prescribed. Call your doctor if you think you are having a problem with your medicine. · Talk to your doctor before you start taking aspirin every day. Aspirin can help certain people lower their risk of a heart attack or stroke. But taking aspirin isn't right for everyone, because it can cause serious bleeding. · See your doctor regularly. You may need to see the doctor more often at first or until your blood pressure comes down. · If you are taking blood pressure medicine, talk to your doctor before you take decongestants or anti-inflammatory medicine, such as ibuprofen. Some of these medicines can raise blood pressure. · Learn how to check your blood pressure at home. Lifestyle changes  · Stay at a healthy weight. This is especially important if you put on weight around the waist. Losing even 10 pounds can help you lower your blood pressure. · If your doctor recommends it, get more exercise. Walking is a good choice. Bit by bit, increase the amount you walk every day. Try for at least 30 minutes on most days of the week. You also may want to swim, bike, or do other activities. · Avoid or limit alcohol. Talk to your doctor about whether you can drink any alcohol. · Try to limit how much sodium you eat to less than 2,300 milligrams (mg) a day. Your doctor may ask you to try to eat less than 1,500 mg a day. · Eat plenty of fruits (such as bananas and oranges), vegetables, legumes, whole grains, and low-fat dairy products. · Lower the amount of saturated fat in your diet. Saturated fat is found in animal products such as milk, cheese, and meat. Limiting these foods may help you lose weight and also lower your risk for heart disease. · Do not smoke. Smoking increases your risk for heart attack and stroke.  If you need help quitting, talk to your doctor about stop-smoking programs and medicines. These can increase your chances of quitting for good. When should you call for help? Call  911 anytime you think you may need emergency care. This may mean having symptoms that suggest that your blood pressure is causing a serious heart or blood vessel problem. Your blood pressure may be over 180/120. For example, call 911 if:    · You have symptoms of a heart attack. These may include:  ? Chest pain or pressure, or a strange feeling in the chest.  ? Sweating. ? Shortness of breath. ? Nausea or vomiting. ? Pain, pressure, or a strange feeling in the back, neck, jaw, or upper belly or in one or both shoulders or arms. ? Lightheadedness or sudden weakness. ? A fast or irregular heartbeat.     · You have symptoms of a stroke. These may include:  ? Sudden numbness, tingling, weakness, or loss of movement in your face, arm, or leg, especially on only one side of your body. ? Sudden vision changes. ? Sudden trouble speaking. ? Sudden confusion or trouble understanding simple statements. ? Sudden problems with walking or balance. ? A sudden, severe headache that is different from past headaches.     · You have severe back or belly pain. Do not wait until your blood pressure comes down on its own. Get help right away. Call your doctor now or seek immediate care if:    · Your blood pressure is much higher than normal (such as 180/120 or higher), but you don't have symptoms.     · You think high blood pressure is causing symptoms, such as:  ? Severe headache.  ? Blurry vision. Watch closely for changes in your health, and be sure to contact your doctor if:    · Your blood pressure measures higher than your doctor recommends at least 2 times. That means the top number is higher or the bottom number is higher, or both.     · You think you may be having side effects from your blood pressure medicine. Where can you learn more?   Go to http://www.gray.com/  Enter [id number] in the search box to learn more about \"High Blood Pressure: Care Instructions. \"  Current as of: December 16, 2019               Content Version: 12.6  © 9609-6287 [employer]. Care instructions adapted under license by [employer] (which disclaims liability or warranty for this information). If you have questions about a medical condition or this instruction, always ask your healthcare professional. [address] any warranty or liability for your use of this information.

## 2020-11-17 ENCOUNTER — TELEPHONE (OUTPATIENT)
Dept: CARDIOLOGY CLINIC | Age: 55
End: 2020-11-17

## 2020-11-17 DIAGNOSIS — I10 ESSENTIAL HYPERTENSION: ICD-10-CM

## 2020-11-17 RX ORDER — METOPROLOL TARTRATE 50 MG/1
50 TABLET ORAL 2 TIMES DAILY
Qty: 60 TAB | Refills: 3 | Status: SHIPPED | OUTPATIENT
Start: 2020-11-17 | End: 2021-06-08

## 2020-11-17 NOTE — TELEPHONE ENCOUNTER
BP better but still elevated. Increase metoprolol to 50 twice daily and get home BP chart for next 4 -5 days.   I have sent a prescription

## 2020-11-17 NOTE — TELEPHONE ENCOUNTER
Patient sent over BP log as instructed. These have been scanned in and ready for you to review. Thank you.

## 2020-12-08 ENCOUNTER — TRANSCRIBE ORDER (OUTPATIENT)
Dept: SCHEDULING | Age: 55
End: 2020-12-08

## 2020-12-08 DIAGNOSIS — R07.9 CHEST PAIN, UNSPECIFIED: Primary | ICD-10-CM

## 2020-12-22 ENCOUNTER — HOSPITAL ENCOUNTER (OUTPATIENT)
Dept: NON INVASIVE DIAGNOSTICS | Age: 55
Discharge: HOME | End: 2020-12-22
Payer: COMMERCIAL

## 2020-12-22 VITALS — BODY MASS INDEX: 33.29 KG/M2 | WEIGHT: 195 LBS | HEIGHT: 64 IN

## 2020-12-22 DIAGNOSIS — R07.9 CHEST PAIN, UNSPECIFIED: ICD-10-CM

## 2020-12-22 LAB
STRESS ANGINA INDEX: 2
STRESS BASELINE DIAS BP: 99 MMHG
STRESS BASELINE HR: 76 BPM
STRESS BASELINE SYS BP: 147 MMHG
STRESS ESTIMATED WORKLOAD: 7 METS
STRESS EXERCISE DUR MIN: NORMAL MIN:SEC
STRESS PEAK DIAS BP: 102 MMHG
STRESS PEAK SYS BP: 181 MMHG
STRESS PERCENT HR ACHIEVED: 76 %
STRESS POST PEAK HR: 126 BPM
STRESS RATE PRESSURE PRODUCT: NORMAL BPM*MMHG
STRESS SR DUKE TREADMILL SCORE: -3
STRESS ST DEPRESSION: 0 MM
STRESS ST ELEVATION: 0 MM
STRESS TARGET HR: 166 BPM

## 2020-12-22 PROCEDURE — 93017 CV STRESS TEST TRACING ONLY: CPT

## 2020-12-28 ENCOUNTER — TRANSCRIBE ORDER (OUTPATIENT)
Dept: SCHEDULING | Age: 55
End: 2020-12-28

## 2020-12-28 DIAGNOSIS — R07.9 CHEST PAIN: Primary | ICD-10-CM

## 2021-07-29 DIAGNOSIS — I10 ESSENTIAL HYPERTENSION: ICD-10-CM

## 2021-07-29 RX ORDER — HYDROCHLOROTHIAZIDE 12.5 MG/1
TABLET ORAL
Qty: 90 TABLET | Refills: 0 | Status: SHIPPED | OUTPATIENT
Start: 2021-07-29

## 2021-08-24 ENCOUNTER — OFFICE VISIT (OUTPATIENT)
Dept: CARDIOLOGY CLINIC | Age: 56
End: 2021-08-24
Payer: COMMERCIAL

## 2021-08-24 VITALS
DIASTOLIC BLOOD PRESSURE: 93 MMHG | HEART RATE: 71 BPM | BODY MASS INDEX: 33.8 KG/M2 | SYSTOLIC BLOOD PRESSURE: 162 MMHG | WEIGHT: 198 LBS | HEIGHT: 64 IN

## 2021-08-24 DIAGNOSIS — R00.2 PALPITATIONS: Primary | ICD-10-CM

## 2021-08-24 DIAGNOSIS — I50.32 CHRONIC DIASTOLIC CONGESTIVE HEART FAILURE (HCC): ICD-10-CM

## 2021-08-24 DIAGNOSIS — I10 ESSENTIAL HYPERTENSION: ICD-10-CM

## 2021-08-24 DIAGNOSIS — E78.00 HYPERCHOLESTEREMIA: ICD-10-CM

## 2021-08-24 PROCEDURE — 99214 OFFICE O/P EST MOD 30 MIN: CPT | Performed by: INTERNAL MEDICINE

## 2021-08-24 RX ORDER — METOPROLOL TARTRATE 50 MG/1
50 TABLET ORAL 3 TIMES DAILY
Qty: 270 TABLET | Refills: 3 | Status: SHIPPED | OUTPATIENT
Start: 2021-08-24 | End: 2021-10-14

## 2021-08-24 NOTE — PROGRESS NOTES
HISTORY OF PRESENT ILLNESS  Michael Bowman is a 55 y.o. female. 10/20 Seen as new patient for uncontrolled hypertension, has had headaches and was recently diagnosed with sleep apnea. H/o nose bleeds with HTN  8/2021  Patient is here for evaluation she presents here for evaluation of palpitation. Recently patient is under significant stress she lost her job and just able to get her another insurance. She has noted increased palpitation more at nighttime where she feels her heart is jumping out of the chest.    Palpitations   The history is provided by the patient. This is a recurrent problem. The current episode started more than 1 week ago (yrs). The problem has been gradually worsening (1/20). The problem occurs every several days (almost daily). The problem is associated with nothing (activity and rest). Associated symptoms include dizziness and shortness of breath. Pertinent negatives include no fever, no malaise/fatigue, no chest pain, no claudication, no orthopnea, no PND, no syncope, no nausea, no vomiting, no headaches and no cough. Her past medical history is significant for hypertension. Hypertension  The history is provided by the medical records. This is a chronic problem. Associated symptoms include shortness of breath. Pertinent negatives include no chest pain and no headaches. Dizziness  The history is provided by the patient. This is a new problem. The current episode started more than 1 week ago (yrs). The problem occurs daily. The problem has not changed (since 1/20 when falls became more) since onset. Associated symptoms include shortness of breath. Pertinent negatives include no chest pain and no headaches. The symptoms are aggravated by standing (3-4 minutes; frequent falls). The symptoms are relieved by rest.   Shortness of Breath  The history is provided by the patient. This is a new problem. The problem occurs intermittently. The current episode started more than 1 week ago (yrs).  The problem has not changed since onset. Pertinent negatives include no fever, no headaches, no cough, no wheezing, no PND, no orthopnea, no chest pain, no syncope, no vomiting, no rash, no leg swelling and no claudication. The problem's precipitants include exercise (steps, shower, house chores). No Known Allergies    Past Medical History:   Diagnosis Date    Bleeding from the nose     frequent    Hypertension     Rotator cuff tear     partial       Family History   Problem Relation Age of Onset    Heart defect Mother     Stroke Neg Hx     Heart Attack Neg Hx        Social History     Tobacco Use    Smoking status: Never Smoker    Smokeless tobacco: Never Used   Substance Use Topics    Alcohol use: Not Currently    Drug use: Never        Current Outpatient Medications   Medication Sig    metoprolol tartrate (LOPRESSOR) 50 mg tablet Take 1 Tablet by mouth three (3) times daily.  hydroCHLOROthiazide (HYDRODIURIL) 12.5 mg tablet take 1 tablet by mouth once daily    oxyCODONE-acetaminophen (Percocet) 5-325 mg per tablet Take  by mouth every six (6) hours as needed for Pain.  hydrALAZINE (APRESOLINE) 50 mg tablet Take 1 Tab by mouth three (3) times daily.  atorvastatin (LIPITOR) 10 mg tablet Take 1 Tab by mouth daily.  gabapentin (NEURONTIN) 600 mg tablet Take 900 mg by mouth daily.  diclofenac EC (VOLTAREN) 75 mg EC tablet Take  by mouth two (2) times a day.  escitalopram oxalate (LEXAPRO) 10 mg tablet Take 10 mg by mouth daily.  verapamil ER (CALAN-SR) 180 mg CR tablet Take 180 mg by mouth two (2) times a day.  Cetirizine (ZyrTEC) 10 mg cap Take  by mouth.  albuterol (ProAir HFA) 90 mcg/actuation inhaler Take  by inhalation. No current facility-administered medications for this visit.         Past Surgical History:   Procedure Laterality Date    HX CARPAL TUNNEL RELEASE      HX HYSTERECTOMY      HX LAP CHOLECYSTECTOMY      HX SHOULDER ARTHROSCOPY         Visit Vitals  BP (!) 162/93   Pulse 71   Ht 5' 4\" (1.626 m)   Wt 89.8 kg (198 lb)   BMI 33.99 kg/m²     Vitals:    08/24/21 1109 08/24/21 1113   BP: (!) 167/91 (!) 162/93   Pulse: 70 71   Height: 5' 4\" (1.626 m)    Weight: 89.8 kg (198 lb)          Diagnostic Studies:  I have reviewed the relevant tests done on the patient and show as follows  EKG tracings reviewed by me today. EKG Results     None        XR Results (most recent):  No results found for this or any previous visit. 10/20 echInterpretation Summary     · LV: Estimated LVEF is 60 - 65%. Normal cavity size, systolic function (ejection fraction normal) and diastolic function. Moderate concentric hypertrophy. · MV: Mild mitral valve regurgitation is present. · PV: Mild pulmonic valve regurgitation is present. · AO: Mild ascending aorta dilatation. Ascending aorta diameter = 3.8 cm. · PA: Pulmonary arterial systolic pressure is 21 mmHg. Comparison Study Information     Prior Study     There is no prior study available for comparison     o    Ms. Bettye Zarco has a reminder for a \"due or due soon\" health maintenance. I have asked that she contact her primary care provider for follow-up on this health maintenance. Review of Systems   Constitutional: Negative for chills, fever, malaise/fatigue and weight loss. HENT: Negative for nosebleeds. Eyes: Negative for discharge. Respiratory: Positive for shortness of breath. Negative for cough and wheezing. Cardiovascular: Positive for palpitations. Negative for chest pain, orthopnea, claudication, leg swelling, syncope and PND. Gastrointestinal: Negative for diarrhea, nausea and vomiting. Genitourinary: Negative for dysuria and hematuria. Musculoskeletal: Negative for joint pain. Skin: Negative for rash. Neurological: Positive for dizziness. Negative for seizures, loss of consciousness and headaches. Endo/Heme/Allergies: Negative for polydipsia. Does not bruise/bleed easily.    Psychiatric/Behavioral: Negative for depression and substance abuse. The patient does not have insomnia. Physical Exam  Constitutional:       General: She is not in acute distress. Appearance: She is well-developed. Comments: obese   HENT:      Head: Normocephalic and atraumatic. Mouth/Throat:      Dentition: Normal dentition. Eyes:      General: No scleral icterus. Right eye: No discharge. Left eye: No discharge. Neck:      Thyroid: No thyromegaly. Vascular: No carotid bruit or JVD. Cardiovascular:      Rate and Rhythm: Normal rate and regular rhythm. Pulses: Intact distal pulses. Heart sounds: Normal heart sounds, S1 normal and S2 normal. No murmur heard. No friction rub. No gallop. Pulmonary:      Effort: Pulmonary effort is normal.      Breath sounds: Normal breath sounds. No wheezing or rales. Abdominal:      Palpations: Abdomen is soft. There is no mass. Tenderness: There is no abdominal tenderness. Musculoskeletal:      Cervical back: Neck supple. Lymphadenopathy:      Cervical:      Right cervical: No superficial cervical adenopathy. Left cervical: No superficial cervical adenopathy. Skin:     General: Skin is warm and dry. Findings: No rash. Neurological:      Mental Status: She is alert and oriented to person, place, and time. Psychiatric:         Behavior: Behavior normal.         ASSESSMENT and PLAN    Mitral regurgitation: Mild 10/20; Aortic ectasia: 10/20 ASC ao 3.8 cm;    HLD : Results for Eileen Robledo (MRN [id number]) as of 11/9/2020 13:26   Ref.  Range 10/7/2020 11:19   Triglyceride Latest Ref Range: 40 - 149 mg/dL 114   Cholesterol, total Latest Ref Range: 110 - 200 mg/dL 189   HDL Cholesterol Latest Ref Range: >=40 mg/dL 44   CHOLESTEROL/HDL Latest Ref Range: 0.0 - 5.0  4.3   Non-HDL Cholesterol Latest Ref Range: <130 mg/dL 145 (H)   VLDL, calculated Latest Ref Range: 8 - 30 mg/dL 23   LDL, calculated Latest Ref Range: 50 - 99 mg/dL 123 (H)   LDL/HDL Ratio Unknown 2.8       As of 10/20, ACC CVD risk score is 13.5% for the next 10 years; start statins      10/20 Patient has multiple symptoms including dizziness, palpitations, shortness of breath and intermittent edema. The symptoms are there for years. She has history of frequent falls and has surgeries on both shoulders and thinks that she falls with dizziness. No orthostatic changes in the office today. Blood pressure is elevated. Add HCTZ and follow-up labs as well as home BP chart. Check echo and event monitor. She cannot raise her arm but I think we can wait on the stress test for now. She has seen a neurologist, Dr. Finesse Oquendo and apparently all work-up has been negative per patient. Not sure if EEG was done to rule out any seizures. 11/20 blood pressure still elevated. Add metoprolol and increase hydralazine. Continues to have significant symptoms likely secondary to chronic diastolic CHF. Diet weight and exercise discussed. She will try plant-based diet  Lipids are elevated, start statins and follow-up labs. Mary Ann Murphy MD Cardiology, Test Supervisor 12/22/2020   Interpretation Summary    · Baseline ECG: Sinus rhythm, non-specific ST-T wave abnormalities Inferior leads. · Moderate risk Duke treadmill score. · Inconclusive stress test.  · Unable to meet goal and needs further testing - Pharmacological Nuclear Stress Testing recommended. Diagnoses and all orders for this visit:    1. Palpitations  Comments:  Recent increase symptoms likely related to stress. I have adjusted medication    2. Essential hypertension  Comments:  Elevated pressure adjust medication  Orders:  -     metoprolol tartrate (LOPRESSOR) 50 mg tablet; Take 1 Tablet by mouth three (3) times daily. 3. Chronic diastolic congestive heart failure (HCC)  Comments:  Stable compensated class II    4.  Hypercholesteremia  Comments:  Continue treatment lab with PCP        Pertinent laboratory and test data reviewed and discussed with patient. See patient instructions also for other medical advice given  8/2021  Recent increased palpitation. Blood pressure uncontrolled. Significant stress recently. I will increase metoprolol to 3 times a day. Monitor symptom and consider further work-up if symptoms persist.  Medications Discontinued During This Encounter   Medication Reason    metoprolol tartrate (LOPRESSOR) 50 mg tablet        Follow-up and Dispositions    · Return in about 6 weeks (around 10/5/2021) for follow up with Dr Mark Anthony Yanez.

## 2021-08-24 NOTE — PROGRESS NOTES
1. Have you been to the ER, urgent care clinic since your last visit? Hospitalized since your last visit?     no  2. Have you seen or consulted any other health care providers outside of the [address] [place] [address] since your last visit? Include any pap smears or colon screening. No     3. Since your last visit, have you had any of the following symptoms?      palpitations.

## 2021-10-14 ENCOUNTER — OFFICE VISIT (OUTPATIENT)
Dept: CARDIOLOGY CLINIC | Age: 56
End: 2021-10-14
Payer: COMMERCIAL

## 2021-10-14 VITALS
HEIGHT: 64 IN | SYSTOLIC BLOOD PRESSURE: 144 MMHG | WEIGHT: 199 LBS | BODY MASS INDEX: 33.97 KG/M2 | DIASTOLIC BLOOD PRESSURE: 83 MMHG | HEART RATE: 68 BPM | OXYGEN SATURATION: 96 %

## 2021-10-14 DIAGNOSIS — R04.0 EPISTAXIS: ICD-10-CM

## 2021-10-14 DIAGNOSIS — E66.9 OBESITY (BMI 30.0-34.9): ICD-10-CM

## 2021-10-14 DIAGNOSIS — I10 ESSENTIAL HYPERTENSION: ICD-10-CM

## 2021-10-14 DIAGNOSIS — I50.32 CHRONIC DIASTOLIC CONGESTIVE HEART FAILURE (HCC): Primary | ICD-10-CM

## 2021-10-14 DIAGNOSIS — E78.00 HYPERCHOLESTEREMIA: ICD-10-CM

## 2021-10-14 PROCEDURE — 99214 OFFICE O/P EST MOD 30 MIN: CPT | Performed by: INTERNAL MEDICINE

## 2021-10-14 RX ORDER — METOPROLOL TARTRATE 100 MG/1
100 TABLET ORAL 2 TIMES DAILY
Qty: 180 TABLET | Refills: 1 | Status: SHIPPED | OUTPATIENT
Start: 2021-10-14 | End: 2022-04-29

## 2021-10-14 RX ORDER — DULOXETIN HYDROCHLORIDE 30 MG/1
30 CAPSULE, DELAYED RELEASE ORAL DAILY
COMMUNITY

## 2021-10-14 RX ORDER — HYDRALAZINE HYDROCHLORIDE 50 MG/1
50 TABLET, FILM COATED ORAL 3 TIMES DAILY
Qty: 270 TABLET | Refills: 1 | Status: SHIPPED | OUTPATIENT
Start: 2021-10-14 | End: 2021-12-09

## 2021-10-14 RX ORDER — MELOXICAM 15 MG/1
15 TABLET ORAL DAILY
COMMUNITY
End: 2021-12-09 | Stop reason: SINTOL

## 2021-10-14 NOTE — PROGRESS NOTES
1. Have you been to the ER, urgent care clinic since your last visit? Hospitalized since your last visit? No    2. Have you seen or consulted any other health care providers outside of the [address] [place] [address] since your last visit? Include any pap smears or colon screening. Yes Where: Neurology pain/     3. Since your last visit, have you had any of the following symptoms? chest pains, palpitations, shortness of breath, dizziness and swelling in legs/arms. 4.  Have you had any blood work, X-rays or cardiac testing? Yes Where: [facility]     Requested: NO     In [facility]: NO    5. Where do you normally have your labs drawn? PCP / [facility]    6. Do you need any refills today?    NO

## 2021-10-14 NOTE — PATIENT INSTRUCTIONS
Medications Discontinued During This Encounter   Medication Reason    escitalopram oxalate (LEXAPRO) 10 mg tablet Alternate Therapy    diclofenac EC (VOLTAREN) 75 mg EC tablet Alternate Therapy    oxyCODONE-acetaminophen (Percocet) 5-325 mg per tablet Therapy Completed    hydrALAZINE (APRESOLINE) 50 mg tablet REORDER    metoprolol tartrate (LOPRESSOR) 50 mg tablet      After the recommended changes have been made in blood pressure medicines, patient advised to keep BP/HR(pulse rate) chart twice daily and bring us results in next 4 to 5 days. Patient may send the results via \"My Chart\" if desired. Please rest for 5-10 minutes before checking blood pressure. Sit on a comfortable chair without crossing the legs and put your arm on a table. We recommend that you use an upper arm cuff. Check the blood pressure 3 times each time you check the blood pressure and record the lowest reading. If you check the blood pressure in both arms, use the higher reading. Learning About the [facility] Diet  What is the Mediterranean diet? The Mediterranean diet is a style of eating rather than a diet plan. It features foods eaten in [place], [place], [place] and [place], and other countries along the [facility]. It emphasizes eating foods like fish, fruits, vegetables, beans, high-fiber breads and whole grains, nuts, and olive oil. This style of eating includes limited red meat, cheese, and sweets. Why choose the Mediterranean diet? A Mediterranean-style diet may improve heart health. It contains more fat than other heart-healthy diets. But the fats are mainly from nuts, unsaturated oils (such as fish oils and olive oil), and certain nut or seed oils (such as canola, soybean, or flaxseed oil). These fats may help protect the heart and blood vessels. How can you get started on the Mediterranean diet? Here are some things you can do to switch to a more Mediterranean way of eating.   What to eat  · Eat a variety of fruits and vegetables each day, such as grapes, blueberries, tomatoes, broccoli, peppers, figs, olives, spinach, eggplant, beans, lentils, and chickpeas. · Eat a variety of whole-grain foods each day, such as oats, brown rice, and whole wheat bread, pasta, and couscous. · Eat fish at least 2 times a week. Try tuna, salmon, mackerel, lake trout, herring, or sardines. · Eat moderate amounts of low-fat dairy products, such as milk, cheese, or yogurt. · Eat moderate amounts of poultry and eggs. · Choose healthy (unsaturated) fats, such as nuts, olive oil, and certain nut or seed oils like canola, soybean, and flaxseed. · Limit unhealthy (saturated) fats, such as butter, palm oil, and coconut oil. And limit fats found in animal products, such as meat and dairy products made with whole milk. Try to eat red meat only a few times a month in very small amounts. · Limit sweets and desserts to only a few times a week. This includes sugar-sweetened drinks like soda. The Mediterranean diet may also include red wine with your meal1 glass each day for women and up to 2 glasses a day for men. Tips for eating at home  · Use herbs, spices, garlic, lemon zest, and citrus juice instead of salt to add flavor to foods. · Add avocado slices to your sandwich instead of rosenbaum. · Have fish for lunch or dinner instead of red meat. Brush the fish with olive oil, and broil or grill it. · Sprinkle your salad with seeds or nuts instead of cheese. · Cook with olive or canola oil instead of butter or oils that are high in saturated fat. · Switch from 2% milk or whole milk to 1% or fat-free milk. · Dip raw vegetables in a vinaigrette dressing or hummus instead of dips made from mayonnaise or sour cream.  · Have a piece of fruit for dessert instead of a piece of cake. Try baked apples, or have some dried fruit. Tips for eating out  · Try broiled, grilled, baked, or poached fish instead of having it fried or breaded.   · Ask your  to have your meals prepared with olive oil instead of butter. · Order dishes made with marinara sauce or sauces made from olive oil. Avoid sauces made from cream or mayonnaise. · Choose whole-grain breads, whole wheat pasta and pizza crust, brown rice, beans, and lentils. · Cut back on butter or margarine on bread. Instead, you can dip your bread in a small amount of olive oil. · Ask for a side salad or grilled vegetables instead of french fries or chips. Where can you learn more? Go to http://www.gentile.com/  Enter O407 in the search box to learn more about \"Learning About the Mediterranean Diet. \"  Current as of: December 17, 2020               Content Version: 13.0  © 9417-9829 [employer]. Care instructions adapted under license by [employer] (which disclaims liability or warranty for this information). If you have questions about a medical condition or this instruction, always ask your healthcare professional. [place] [address] any warranty or liability for your use of this information.

## 2021-10-14 NOTE — PROGRESS NOTES
HISTORY OF PRESENT ILLNESS  Carly Hernandez is a 55 y.o. female. Follow-up of difficult to control hypertension, CHF, hyperlipidemia, dizziness, palpitations, obesity    10/20 Seen as new patient for uncontrolled hypertension, has had headaches and was recently diagnosed with sleep apnea. H/o nose bleeds with HTN  8/2021  Patient is here for evaluation she presents here for evaluation of palpitation. Recently patient is under significant stress she lost her job and just able to get her another insurance. She has noted increased palpitation more at nighttime where she feels her heart is jumping out of the chest.  10/21 continues to have headache. Had nosebleed this morning. Blood pressure fluctuating at home. Pains all over the body and diagnosed with fibromyalgia by PCP. Palpitations   The history is provided by the patient. This is a recurrent problem. The current episode started more than 1 week ago (yrs). The problem has been gradually worsening (1/20). The problem occurs every several days (3-4/wk). On average, each episode lasts 40 seconds. The problem is associated with exercise (activity and rest). Associated symptoms include dizziness and shortness of breath. Pertinent negatives include no fever, no malaise/fatigue, no chest pain, no claudication, no orthopnea, no PND, no syncope, no nausea, no vomiting, no headaches and no cough. Her past medical history is significant for hypertension. Dizziness  The history is provided by the patient. This is a new problem. The current episode started more than 1 week ago (yrs). The problem occurs daily (Intermittently). The problem has not changed (since 1/20 when falls became more) since onset. Associated symptoms include shortness of breath. Pertinent negatives include no chest pain and no headaches. The symptoms are aggravated by standing (3-4 minutes; frequent falls).  The symptoms are relieved by rest.   Shortness of Breath  The history is provided by the patient. This is a new problem. The problem occurs intermittently. The current episode started more than 1 week ago (yrs). The problem has not changed since onset. Pertinent negatives include no fever, no headaches, no cough, no wheezing, no PND, no orthopnea, no chest pain, no syncope, no vomiting, no rash, no leg swelling and no claudication. The problem's precipitants include exercise (steps, shower, house chores). Follow-up  Associated symptoms include shortness of breath. Pertinent negatives include no chest pain and no headaches. Allergies   Allergen Reactions    Latex Rash       Past Medical History:   Diagnosis Date    Bleeding from the nose     frequent    Hypertension     Rotator cuff tear     partial       Family History   Problem Relation Age of Onset    Heart defect Mother     Stroke Neg Hx     Heart Attack Neg Hx        Social History     Tobacco Use    Smoking status: Never Smoker    Smokeless tobacco: Never Used   Substance Use Topics    Alcohol use: Not Currently    Drug use: Never        Current Outpatient Medications   Medication Sig    DULoxetine (Cymbalta) 30 mg capsule Take 30 mg by mouth daily.  meloxicam (MOBIC) 15 mg tablet Take 15 mg by mouth daily.  metoprolol tartrate (LOPRESSOR) 50 mg tablet Take 1 Tablet by mouth three (3) times daily.  hydroCHLOROthiazide (HYDRODIURIL) 12.5 mg tablet take 1 tablet by mouth once daily    hydrALAZINE (APRESOLINE) 50 mg tablet Take 1 Tab by mouth three (3) times daily. (Patient taking differently: Take 50 mg by mouth two (2) times a day.)    atorvastatin (LIPITOR) 10 mg tablet Take 1 Tab by mouth daily.  gabapentin (NEURONTIN) 600 mg tablet Take 900 mg by mouth daily.  verapamil ER (CALAN-SR) 180 mg CR tablet Take 180 mg by mouth two (2) times a day.  Cetirizine (ZyrTEC) 10 mg cap Take  by mouth.  albuterol (ProAir HFA) 90 mcg/actuation inhaler Take  by inhalation.      No current facility-administered medications for this visit. Past Surgical History:   Procedure Laterality Date    HX CARPAL TUNNEL RELEASE      HX HYSTERECTOMY      HX LAP CHOLECYSTECTOMY      HX SHOULDER ARTHROSCOPY         Visit Vitals  BP (!) 144/83 (BP 1 Location: Right arm, BP Patient Position: Sitting, BP Cuff Size: Large adult)   Pulse 68   Ht 5' 4\" (1.626 m)   Wt 90.3 kg (199 lb)   SpO2 96%   BMI 34.16 kg/m²     Vitals:    10/14/21 1028 10/14/21 1046   BP: (!) 159/86 (!) 144/83   BP 1 Location: Right arm Right arm   BP Patient Position: Sitting Sitting   BP Cuff Size: Adult Large adult   Pulse: 73 68   Height: 5' 4\" (1.626 m)    Weight: 90.3 kg (199 lb)    SpO2: 96%          Diagnostic Studies:  I have reviewed the relevant tests done on the patient and show as follows  EKG tracings reviewed by me today. EKG Results     None        XR Results (most recent):  No results found for this or any previous visit. 10/20 echInterpretation Summary     · LV: Estimated LVEF is 60 - 65%. Normal cavity size, systolic function (ejection fraction normal) and diastolic function. Moderate concentric hypertrophy. · MV: Mild mitral valve regurgitation is present. · PV: Mild pulmonic valve regurgitation is present. · AO: Mild ascending aorta dilatation. Ascending aorta diameter = 3.8 cm. · PA: Pulmonary arterial systolic pressure is 21 mmHg. Comparison Study Information     Prior Study     There is no prior study available for comparison     o    Ms. Jorge Reyes has a reminder for a \"due or due soon\" health maintenance. I have asked that she contact her primary care provider for follow-up on this health maintenance. Review of Systems   Constitutional: Negative for chills, fever, malaise/fatigue and weight loss. HENT: Negative for nosebleeds. Eyes: Negative for discharge. Respiratory: Positive for shortness of breath. Negative for cough and wheezing. Cardiovascular: Positive for palpitations.  Negative for chest pain, orthopnea, claudication, leg swelling, syncope and PND. Gastrointestinal: Negative for diarrhea, nausea and vomiting. Genitourinary: Negative for dysuria and hematuria. Musculoskeletal: Negative for joint pain. Skin: Negative for rash. Neurological: Positive for dizziness. Negative for seizures, loss of consciousness and headaches. Endo/Heme/Allergies: Negative for polydipsia. Does not bruise/bleed easily. Psychiatric/Behavioral: Negative for depression and substance abuse. The patient does not have insomnia. Physical Exam  Constitutional:       General: She is not in acute distress. Appearance: She is well-developed. Comments: obese   HENT:      Head: Normocephalic and atraumatic. Mouth/Throat:      Dentition: Normal dentition. Eyes:      General: No scleral icterus. Right eye: No discharge. Left eye: No discharge. Neck:      Thyroid: No thyromegaly. Vascular: No carotid bruit or JVD. Cardiovascular:      Rate and Rhythm: Normal rate and regular rhythm. Pulses: Intact distal pulses. Heart sounds: Normal heart sounds, S1 normal and S2 normal. No murmur heard. No friction rub. No gallop. Pulmonary:      Effort: Pulmonary effort is normal.      Breath sounds: Normal breath sounds. No wheezing or rales. Abdominal:      Palpations: Abdomen is soft. There is no mass. Tenderness: There is no abdominal tenderness. Musculoskeletal:      Cervical back: Neck supple. Lymphadenopathy:      Cervical:      Right cervical: No superficial cervical adenopathy. Left cervical: No superficial cervical adenopathy. Skin:     General: Skin is warm and dry. Findings: No rash. Neurological:      Mental Status: She is alert and oriented to person, place, and time. Psychiatric:         Behavior: Behavior normal.         ASSESSMENT and PLAN    Mitral regurgitation: Mild 10/20;   Aortic ectasia: 10/20 ASC ao 3.8 cm;    HLD : Results for Augustaelisecass Montgomery (MRN [id number]) as of 11/9/2020 13:26   Ref. Range 10/7/2020 11:19   Triglyceride Latest Ref Range: 40 - 149 mg/dL 114   Cholesterol, total Latest Ref Range: 110 - 200 mg/dL 189   HDL Cholesterol Latest Ref Range: >=40 mg/dL 44   CHOLESTEROL/HDL Latest Ref Range: 0.0 - 5.0  4.3   Non-HDL Cholesterol Latest Ref Range: <130 mg/dL 145 (H)   VLDL, calculated Latest Ref Range: 8 - 30 mg/dL 23   LDL, calculated Latest Ref Range: 50 - 99 mg/dL 123 (H)   LDL/HDL Ratio Unknown 2.8   6/21 , HDL 40, , ; As of 10/20, ACC CVD risk score is 13.5% for the next 10 years; start statins      10/20 Patient has multiple symptoms including dizziness, palpitations, shortness of breath and intermittent edema. The symptoms are there for years. She has history of frequent falls and has surgeries on both shoulders and thinks that she falls with dizziness. No orthostatic changes in the office today. Blood pressure is elevated. Add HCTZ and follow-up labs as well as home BP chart. Check echo and event monitor. She cannot raise her arm but I think we can wait on the stress test for now. She has seen a neurologist, Dr. Jb Wilson and apparently all work-up has been negative per patient. Not sure if EEG was done to rule out any seizures. 11/20 blood pressure still elevated. Add metoprolol and increase hydralazine. Continues to have significant symptoms likely secondary to chronic diastolic CHF. Diet weight and exercise discussed. She will try plant-based diet  Lipids are elevated, start statins and follow-up labs. Alina Solorzano MD Cardiology, Test Supervisor 12/22/2020   Interpretation Summary    · Baseline ECG: Sinus rhythm, non-specific ST-T wave abnormalities Inferior leads. · Moderate risk Duke treadmill score. · Inconclusive stress test.  · Unable to meet goal and needs further testing - Pharmacological Nuclear Stress Testing recommended. 8/2021  Recent increased palpitation. Blood pressure uncontrolled. Significant stress recently. I will increase metoprolol to 3 times a day. Monitor symptom and consider further work-up if symptoms persist.      Diagnoses and all orders for this visit:    1. Chronic diastolic congestive heart failure (HCC)  -     NUCLEAR CARDIAC STRESS TEST; Future    2. Essential hypertension  -     metoprolol tartrate (LOPRESSOR) 100 mg IR tablet; Take 1 Tablet by mouth two (2) times a day. -     hydrALAZINE (APRESOLINE) 50 mg tablet; Take 1 Tablet by mouth three (3) times daily. 3. Hypercholesteremia    4. Obesity (BMI 30.0-34.9)    5. Epistaxis  -     REFERRAL TO ENT-OTOLARYNGOLOGY        Pertinent laboratory and test data reviewed and discussed with patient. See patient instructions also for other medical advice given    Medications Discontinued During This Encounter   Medication Reason    escitalopram oxalate (LEXAPRO) 10 mg tablet Alternate Therapy    diclofenac EC (VOLTAREN) 75 mg EC tablet Alternate Therapy    oxyCODONE-acetaminophen (Percocet) 5-325 mg per tablet Therapy Completed    hydrALAZINE (APRESOLINE) 50 mg tablet REORDER    metoprolol tartrate (LOPRESSOR) 50 mg tablet        Follow-up and Dispositions    · Return in about 2 months (around 12/14/2021), or if symptoms worsen or fail to improve, for post test, BP log x 4-5 days post med changes. 10/14/2021 CHF persistent NYHA class II-III. Blood pressure better today but intermittently still very high at home as patient informed. Increase metoprolol 200 twice daily and advised to take hydralazine 3 times a day. Recurrent epistaxis and will refer to ENT. Diet weight discussed again. Mediterranean diet guidelines printed. Lipids are not controlled very well. Stressed the diet plan as well as weight loss and regular walking.

## 2021-12-09 ENCOUNTER — OFFICE VISIT (OUTPATIENT)
Dept: CARDIOLOGY CLINIC | Age: 56
End: 2021-12-09
Payer: COMMERCIAL

## 2021-12-09 VITALS
HEART RATE: 61 BPM | BODY MASS INDEX: 33.29 KG/M2 | WEIGHT: 195 LBS | SYSTOLIC BLOOD PRESSURE: 189 MMHG | DIASTOLIC BLOOD PRESSURE: 99 MMHG | HEIGHT: 64 IN

## 2021-12-09 DIAGNOSIS — E66.9 OBESITY (BMI 30.0-34.9): ICD-10-CM

## 2021-12-09 DIAGNOSIS — I10 SEVERE UNCONTROLLED HYPERTENSION: ICD-10-CM

## 2021-12-09 DIAGNOSIS — E78.00 HYPERCHOLESTEREMIA: ICD-10-CM

## 2021-12-09 DIAGNOSIS — I10 SEVERE UNCONTROLLED HYPERTENSION: Primary | ICD-10-CM

## 2021-12-09 DIAGNOSIS — I50.32 CHRONIC DIASTOLIC CONGESTIVE HEART FAILURE (HCC): ICD-10-CM

## 2021-12-09 PROCEDURE — 99214 OFFICE O/P EST MOD 30 MIN: CPT | Performed by: INTERNAL MEDICINE

## 2021-12-09 PROCEDURE — 93000 ELECTROCARDIOGRAM COMPLETE: CPT | Performed by: INTERNAL MEDICINE

## 2021-12-09 RX ORDER — CANE TIPS
2 EACH MISCELLANEOUS ONCE
Qty: 2 EACH | Refills: 0 | Status: SHIPPED | OUTPATIENT
Start: 2021-12-09 | End: 2021-12-09

## 2021-12-09 RX ORDER — CLONIDINE HYDROCHLORIDE 0.1 MG/1
0.1 TABLET ORAL
Qty: 90 TABLET | Refills: 1 | Status: SHIPPED | OUTPATIENT
Start: 2021-12-09 | End: 2022-07-26

## 2021-12-09 RX ORDER — ATOGEPANT 60 MG/1
TABLET ORAL
COMMUNITY

## 2021-12-09 RX ORDER — HYDRALAZINE HYDROCHLORIDE 100 MG/1
100 TABLET, FILM COATED ORAL 3 TIMES DAILY
Qty: 270 TABLET | Refills: 1 | Status: SHIPPED | OUTPATIENT
Start: 2021-12-09 | End: 2022-07-26

## 2021-12-09 NOTE — PROGRESS NOTES
HISTORY OF PRESENT ILLNESS  Tiffani Coffman is a 55 y.o. female. Follow-up of difficult to control hypertension, CHF, hyperlipidemia, dizziness, palpitations, obesity    12/21 continues to have headaches and dizziness consistent with vertigo. Also being followed by ENT. Status post ablation of a vessel in the nose and since then no problems with the epistaxis. 10/20 Seen as new patient for uncontrolled hypertension, has had headaches and was recently diagnosed with sleep apnea. H/o nose bleeds with HTN  8/2021  Patient is here for evaluation she presents here for evaluation of palpitation. Recently patient is under significant stress she lost her job and just able to get her another insurance. She has noted increased palpitation more at nighttime where she feels her heart is jumping out of the chest.  10/21 continues to have headache. Had nosebleed this morning. Blood pressure fluctuating at home. Pains all over the body and diagnosed with fibromyalgia by PCP. Follow-up  Associated symptoms include shortness of breath. Pertinent negatives include no chest pain and no headaches. Palpitations   The history is provided by the patient. This is a recurrent problem. The current episode started more than 1 week ago (yrs). The problem has been gradually worsening (1/20). The problem occurs every several days (3-4/wk). On average, each episode lasts 40 seconds. The problem is associated with exercise (activity and rest). Associated symptoms include dizziness and shortness of breath. Pertinent negatives include no fever, no malaise/fatigue, no chest pain, no claudication, no orthopnea, no PND, no syncope, no nausea, no vomiting, no headaches and no cough. Her past medical history is significant for hypertension. Dizziness  The history is provided by the patient. This is a new problem. The current episode started more than 1 week ago (yrs). The problem occurs daily (Intermittently).  The problem has not changed (since 1/20 when falls became more) since onset. Associated symptoms include shortness of breath. Pertinent negatives include no chest pain and no headaches. The symptoms are aggravated by standing (3-4 minutes; frequent falls). The symptoms are relieved by rest.   Shortness of Breath  The history is provided by the patient. This is a new problem. The problem occurs intermittently. The current episode started more than 1 week ago (yrs). The problem has not changed since onset. Pertinent negatives include no fever, no headaches, no cough, no wheezing, no PND, no orthopnea, no chest pain, no syncope, no vomiting, no rash, no leg swelling and no claudication. The problem's precipitants include exercise (steps, shower, house chores). Allergies   Allergen Reactions    Latex Rash       Past Medical History:   Diagnosis Date    Bleeding from the nose     frequent    Hypertension     Rotator cuff tear     partial       Family History   Problem Relation Age of Onset    Heart defect Mother     Stroke Neg Hx     Heart Attack Neg Hx        Social History     Tobacco Use    Smoking status: Never Smoker    Smokeless tobacco: Never Used   Substance Use Topics    Alcohol use: Not Currently    Drug use: Never        Current Outpatient Medications   Medication Sig    atogepant (Qulipta) 60 mg tab Take  by mouth.  DULoxetine (Cymbalta) 30 mg capsule Take 30 mg by mouth daily.  meloxicam (MOBIC) 15 mg tablet Take 15 mg by mouth daily.  metoprolol tartrate (LOPRESSOR) 100 mg IR tablet Take 1 Tablet by mouth two (2) times a day.  hydrALAZINE (APRESOLINE) 50 mg tablet Take 1 Tablet by mouth three (3) times daily.  hydroCHLOROthiazide (HYDRODIURIL) 12.5 mg tablet take 1 tablet by mouth once daily    atorvastatin (LIPITOR) 10 mg tablet Take 1 Tab by mouth daily.  gabapentin (NEURONTIN) 600 mg tablet Take 900 mg by mouth daily.     verapamil ER (CALAN-SR) 180 mg CR tablet Take 180 mg by mouth two (2) times a day.    Cetirizine (ZyrTEC) 10 mg cap Take  by mouth.  albuterol (ProAir HFA) 90 mcg/actuation inhaler Take  by inhalation. No current facility-administered medications for this visit. Past Surgical History:   Procedure Laterality Date    HX CARPAL TUNNEL RELEASE      HX HYSTERECTOMY      HX LAP CHOLECYSTECTOMY      HX SHOULDER ARTHROSCOPY         Visit Vitals  BP (!) 189/99   Pulse 61   Ht 5' 4\" (1.626 m)   Wt 88.5 kg (195 lb)   BMI 33.47 kg/m²     Vitals:    12/09/21 1022 12/09/21 1038   BP: (!) 199/94 (!) 189/99   Pulse: 65 61   Height: 5' 4\" (1.626 m)    Weight: 88.5 kg (195 lb)          Diagnostic Studies:  I have reviewed the relevant tests done on the patient and show as follows  EKG tracings reviewed by me today. EKG Results     Procedure 720 Value Units Date/Time    AMB POC EKG ROUTINE W/ 12 LEADS, INTER & REP [[id number]] Resulted: 12/09/21 1040    Order Status: Completed Updated: 12/09/21 1048        XR Results (most recent):  No results found for this or any previous visit. 10/20 echInterpretation Summary     · LV: Estimated LVEF is 60 - 65%. Normal cavity size, systolic function (ejection fraction normal) and diastolic function. Moderate concentric hypertrophy. · MV: Mild mitral valve regurgitation is present. · PV: Mild pulmonic valve regurgitation is present. · AO: Mild ascending aorta dilatation. Ascending aorta diameter = 3.8 cm. · PA: Pulmonary arterial systolic pressure is 21 mmHg. Comparison Study Information     Prior Study     There is no prior study available for comparison     10/28/21    NUCLEAR CARDIAC STRESS TEST 10/28/2021 11/2/2021    Interpretation Summary  · Baseline ECG: Normal sinus rhythm, non-specific ST-T wave abnormalities. · Stress test: Stress EKG normal.  · SPECT: Calculated ejection fraction is 81% (normal EF value is equal to or greater than 50%).  The TID ratio is 1.17.  · SPECT: Myocardial perfusion imaging defect 1: caused by soft tissue. Scanned left arm down  · SPECT: Left ventricular perfusion is probably normal. Myocardial perfusion imaging defect 1: There is a defect that is small in size present in the mid-apical segment(s) of the anterior and septal wall(s) that is non-reversible. There is normal wall motion in the defect area. Viability in the area is good. The defect appears to be an artifact caused by soft tissue. Perfusion defect was visually present without quantitative evidence. · SPECT: Left ventricular perfusion is probably normal. Myocardial perfusion imaging supports a low risk stress test.    Signed by: Jethro Rodríguez MD on 10/28/2021  2:15 PM    Ms. Cheri Carreno has a reminder for a \"due or due soon\" health maintenance. I have asked that she contact her primary care provider for follow-up on this health maintenance. Review of Systems   Constitutional: Negative for chills, fever, malaise/fatigue and weight loss. HENT: Negative for nosebleeds. Eyes: Negative for discharge. Respiratory: Positive for shortness of breath. Negative for cough and wheezing. Cardiovascular: Positive for palpitations. Negative for chest pain, orthopnea, claudication, leg swelling, syncope and PND. Gastrointestinal: Negative for diarrhea, nausea and vomiting. Genitourinary: Negative for dysuria and hematuria. Musculoskeletal: Negative for joint pain. Skin: Negative for rash. Neurological: Positive for dizziness. Negative for seizures, loss of consciousness and headaches. Endo/Heme/Allergies: Negative for polydipsia. Does not bruise/bleed easily. Psychiatric/Behavioral: Negative for depression and substance abuse. The patient does not have insomnia. Physical Exam  Constitutional:       General: She is not in acute distress. Appearance: She is well-developed. Comments: obese   HENT:      Head: Normocephalic and atraumatic. Mouth/Throat:      Dentition: Normal dentition.    Eyes:      General: No scleral icterus. Right eye: No discharge. Left eye: No discharge. Neck:      Thyroid: No thyromegaly. Vascular: No carotid bruit or JVD. Cardiovascular:      Rate and Rhythm: Normal rate and regular rhythm. Pulses: Intact distal pulses. Heart sounds: Normal heart sounds, S1 normal and S2 normal. No murmur heard. No friction rub. No gallop. Pulmonary:      Effort: Pulmonary effort is normal.      Breath sounds: Normal breath sounds. No wheezing or rales. Abdominal:      Palpations: Abdomen is soft. There is no mass. Tenderness: There is no abdominal tenderness. Musculoskeletal:      Cervical back: Neck supple. Lymphadenopathy:      Cervical:      Right cervical: No superficial cervical adenopathy. Left cervical: No superficial cervical adenopathy. Skin:     General: Skin is warm and dry. Findings: No rash. Neurological:      Mental Status: She is alert and oriented to person, place, and time. Psychiatric:         Behavior: Behavior normal.         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 Range 10/7/2020 11:19   Triglyceride Latest Ref Range: 40 - 149 mg/dL 114   Cholesterol, total Latest Ref Range: 110 - 200 mg/dL 189   HDL Cholesterol Latest Ref Range: >=40 mg/dL 44   CHOLESTEROL/HDL Latest Ref Range: 0.0 - 5.0  4.3   Non-HDL Cholesterol Latest Ref Range: <130 mg/dL 145 (H)   VLDL, calculated Latest Ref Range: 8 - 30 mg/dL 23   LDL, calculated Latest Ref Range: 50 - 99 mg/dL 123 (H)   LDL/HDL Ratio Unknown 2.8   6/21 , HDL 40, , ; As of 10/20, ACC CVD risk score is 13.5% for the next 10 years; start statins      10/20 Patient has multiple symptoms including dizziness, palpitations, shortness of breath and intermittent edema. The symptoms are there for years.   She has history of frequent falls and has surgeries on both shoulders and thinks that she falls with dizziness. No orthostatic changes in the office today. Blood pressure is elevated. Add HCTZ and follow-up labs as well as home BP chart. Check echo and event monitor. She cannot raise her arm but I think we can wait on the stress test for now. She has seen a neurologist, Dr. Pedro Clinton and apparently all work-up has been negative per patient. Not sure if EEG was done to rule out any seizures. 11/20 blood pressure still elevated. Add metoprolol and increase hydralazine. Continues to have significant symptoms likely secondary to chronic diastolic CHF. Diet weight and exercise discussed. She will try plant-based diet  Lipids are elevated, start statins and follow-up labs. Gia Chacon MD Cardiology, Test Supervisor 12/22/2020   Interpretation Summary    · Baseline ECG: Sinus rhythm, non-specific ST-T wave abnormalities Inferior leads. · Moderate risk Duke treadmill score. · Inconclusive stress test.  · Unable to meet goal and needs further testing - Pharmacological Nuclear Stress Testing recommended. 8/2021  Recent increased palpitation. Blood pressure uncontrolled. Significant stress recently. I will increase metoprolol to 3 times a day. Monitor symptom and consider further work-up if symptoms persist.    10/14/2021 CHF persistent NYHA class II-III. Blood pressure better today but intermittently still very high at home as patient informed. Increase metoprolol 200 twice daily and advised to take hydralazine 3 times a day. Recurrent epistaxis and will refer to ENT. Diet weight discussed again. Mediterranean diet guidelines printed. Lipids are not controlled very well. Stressed the diet plan as well as weight loss and regular walking. Diagnoses and all orders for this visit:    1. Severe uncontrolled hypertension  -     AMB POC EKG ROUTINE W/ 12 LEADS, INTER & REP  -     hydrALAZINE (APRESOLINE) 100 mg tablet;  Take 1 Tablet by mouth three (3) times daily.  -     ALDOSTERONE; Future  -     ALDOSTERONE/RENIN RATIO; Future  -     CATECHOLAMINES, PLASMA; Future  -     METANEPHRINES, PLASMA; Future  -     RENIN ACTIVITY; Future  -     DUPLEX RENAL ART/WILIAN BILATERAL; Future    2. Chronic diastolic congestive heart failure ([facility] [address]    3. Hypercholesteremia    4. Obesity (BMI 30.0-34.9)    Other orders  -     cloNIDine HCL (CATAPRES) 0.1 mg tablet; Take 1 Tablet by mouth nightly. -     Cane naina; 2 Packages by Does Not Apply route once for 1 dose. Pertinent laboratory and test data reviewed and discussed with patient. See patient instructions also for other medical advice given    Medications Discontinued During This Encounter   Medication Reason    meloxicam (MOBIC) 15 mg tablet Side Effects    hydrALAZINE (APRESOLINE) 50 mg tablet        Follow-up and Dispositions    · Return in about 6 weeks (around 1/20/2022), or if symptoms worsen or fail to improve, for BP log x 4-5 days post med changes, post test.       12/9/2021 continues to have multiple symptoms including dizziness for which she is undergoing work-up by ENT also. Blood pressure is severely elevated still. Increase hydralazine and add clonidine. Follow the BP chart at home. Work-up for secondary hypertension ordered. She is trying to lose weight and has lost a few pounds. Will give some more time to work on the lipids by diet.

## 2021-12-09 NOTE — PATIENT INSTRUCTIONS
Medications Discontinued During This Encounter   Medication Reason    meloxicam (MOBIC) 15 mg tablet Side Effects    hydrALAZINE (APRESOLINE) 50 mg tablet      After the recommended changes have been made in blood pressure medicines, patient advised to keep BP/HR(pulse rate) chart twice daily and bring us results in next 4 to 5 days. Patient may send the results via \"My Chart\" if desired. Please rest for 5-10 minutes before checking blood pressure. Sit on a comfortable chair without crossing the legs and put your arm on a table. We recommend that you use an upper arm cuff. Check the blood pressure 3 times each time you check the blood pressure and record the lowest reading. If you check the blood pressure in both arms, use the higher reading. A Healthy Heart: Care Instructions  Your Care Instructions     Coronary artery disease, also called heart disease, occurs when a substance called plaque builds up in the vessels that supply oxygen-rich blood to your heart muscle. This can narrow the blood vessels and reduce blood flow. A heart attack happens when blood flow is completely blocked. A high-fat diet, smoking, and other factors increase the risk of heart disease. Your doctor has found that you have a chance of having heart disease. You can do lots of things to keep your heart healthy. It may not be easy, but you can change your diet, exercise more, and quit smoking. These steps really work to lower your chance of heart disease. Follow-up care is a key part of your treatment and safety. Be sure to make and go to all appointments, and call your doctor if you are having problems. It's also a good idea to know your test results and keep a list of the medicines you take. How can you care for yourself at home? Diet    · Use less salt when you cook and eat. This helps lower your blood pressure. Taste food before salting. Add only a little salt when you think you need it.  With time, your taste buds will adjust to less salt.     · Eat fewer snack items, fast foods, canned soups, and other high-salt, high-fat, processed foods.     · Read food labels and try to avoid saturated and trans fats. They increase your risk of heart disease by raising cholesterol levels.     · Limit the amount of solid fat-butter, margarine, and shortening-you eat. Use olive, peanut, or canola oil when you cook. Bake, broil, and steam foods instead of frying them.     · Eat a variety of fruit and vegetables every day. Dark green, deep orange, red, or yellow fruits and vegetables are especially good for you. Examples include spinach, carrots, peaches, and berries.     · Foods high in fiber can reduce your cholesterol and provide important vitamins and minerals. High-fiber foods include whole-grain cereals and breads, oatmeal, beans, brown rice, citrus fruits, and apples.     · Eat lean proteins. Heart-healthy proteins include seafood, lean meats and poultry, eggs, beans, peas, nuts, seeds, and soy products.     · Limit drinks and foods with added sugar. These include candy, desserts, and soda pop. Lifestyle changes    · If your doctor recommends it, get more exercise. Walking is a good choice. Bit by bit, increase the amount you walk every day. Try for at least 30 minutes on most days of the week. You also may want to swim, bike, or do other activities.     · Do not smoke. If you need help quitting, talk to your doctor about stop-smoking programs and medicines. These can increase your chances of quitting for good. Quitting smoking may be the most important step you can take to protect your heart. It is never too late to quit.     · Limit alcohol to 2 drinks a day for men and 1 drink a day for women. Too much alcohol can cause health problems.     · Manage other health problems such as diabetes, high blood pressure, and high cholesterol. If you think you may have a problem with alcohol or drug use, talk to your doctor.    Medicines    · Take your medicines exactly as prescribed. Call your doctor if you think you are having a problem with your medicine.     · If your doctor recommends aspirin, take the amount directed each day. Make sure you take aspirin and not another kind of pain reliever, such as acetaminophen (Tylenol). When should you call for help? Call 911 if you have symptoms of a heart attack. These may include:    · Chest pain or pressure, or a strange feeling in the chest.     · Sweating.     · Shortness of breath.     · Pain, pressure, or a strange feeling in the back, neck, jaw, or upper belly or in one or both shoulders or arms.     · Lightheadedness or sudden weakness.     · A fast or irregular heartbeat. After you call 911, the  may tell you to chew 1 adult-strength or 2 to 4 low-dose aspirin. Wait for an ambulance. Do not try to drive yourself. Watch closely for changes in your health, and be sure to contact your doctor if you have any problems. Where can you learn more? Go to http://www.gentile.com/  Enter F075 in the search box to learn more about \"A Healthy Heart: Care Instructions. \"  Current as of: April 29, 2021               Content Version: 13.0  © 2006-2021 [employer]. Care instructions adapted under license by [employer] (which disclaims liability or warranty for this information). If you have questions about a medical condition or this instruction, always ask your healthcare professional. [place] [address] any warranty or liability for your use of this information.

## 2021-12-09 NOTE — PROGRESS NOTES
1. Have you been to the ER, urgent care clinic since your last visit? Hospitalized since your last visit?     no    2. Have you seen or consulted any other health care providers outside of the [address] [place] [address] since your last visit? Include any pap smears or colon screening. No     3. Since your last visit, have you had any of the following symptoms?     no    4. Have you had any blood work, X-rays or cardiac testing? No     }    5. Where do you normally have your labs drawn? [facility]    6. Do you need any refills today?    No

## 2022-04-29 DIAGNOSIS — I10 ESSENTIAL HYPERTENSION: ICD-10-CM

## 2022-04-29 RX ORDER — METOPROLOL TARTRATE 100 MG/1
TABLET ORAL
Qty: 180 TABLET | Refills: 1 | Status: SHIPPED | OUTPATIENT
Start: 2022-04-29

## 2022-07-25 DIAGNOSIS — I10 SEVERE UNCONTROLLED HYPERTENSION: ICD-10-CM

## 2022-07-26 RX ORDER — HYDRALAZINE HYDROCHLORIDE 100 MG/1
TABLET, FILM COATED ORAL
Qty: 270 TABLET | Refills: 1 | Status: SHIPPED | OUTPATIENT
Start: 2022-07-26

## 2022-07-26 RX ORDER — CLONIDINE HYDROCHLORIDE 0.1 MG/1
0.1 TABLET ORAL
Qty: 90 TABLET | Refills: 1 | Status: SHIPPED | OUTPATIENT
Start: 2022-07-26

## 2023-03-06 RX ORDER — CLONIDINE HYDROCHLORIDE 0.1 MG/1
TABLET ORAL
Qty: 90 TABLET | Refills: 1 | Status: SHIPPED | OUTPATIENT
Start: 2023-03-06

## 2023-03-06 RX ORDER — HYDRALAZINE HYDROCHLORIDE 100 MG/1
TABLET, FILM COATED ORAL
Qty: 270 TABLET | Refills: 0 | Status: SHIPPED | OUTPATIENT
Start: 2023-03-06

## 2023-07-05 RX ORDER — HYDRALAZINE HYDROCHLORIDE 100 MG/1
TABLET, FILM COATED ORAL
Qty: 270 TABLET | Refills: 0 | OUTPATIENT
Start: 2023-07-05